# Patient Record
Sex: FEMALE | Race: WHITE | NOT HISPANIC OR LATINO
[De-identification: names, ages, dates, MRNs, and addresses within clinical notes are randomized per-mention and may not be internally consistent; named-entity substitution may affect disease eponyms.]

---

## 2017-08-23 ENCOUNTER — APPOINTMENT (OUTPATIENT)
Dept: DERMATOLOGY | Facility: CLINIC | Age: 62
End: 2017-08-23

## 2017-10-19 ENCOUNTER — TRANSCRIPTION ENCOUNTER (OUTPATIENT)
Age: 62
End: 2017-10-19

## 2017-12-11 ENCOUNTER — APPOINTMENT (OUTPATIENT)
Dept: DERMATOLOGY | Facility: CLINIC | Age: 62
End: 2017-12-11

## 2017-12-19 ENCOUNTER — APPOINTMENT (OUTPATIENT)
Dept: DERMATOLOGY | Facility: CLINIC | Age: 62
End: 2017-12-19
Payer: MEDICARE

## 2017-12-19 VITALS — HEIGHT: 63 IN | BODY MASS INDEX: 30.12 KG/M2 | WEIGHT: 170 LBS

## 2017-12-19 DIAGNOSIS — L25.9 UNSPECIFIED CONTACT DERMATITIS, UNSPECIFIED CAUSE: ICD-10-CM

## 2017-12-19 DIAGNOSIS — H54.7 UNSPECIFIED VISUAL LOSS: ICD-10-CM

## 2017-12-19 DIAGNOSIS — Z87.891 PERSONAL HISTORY OF NICOTINE DEPENDENCE: ICD-10-CM

## 2017-12-19 DIAGNOSIS — Z85.118 PERSONAL HISTORY OF OTHER MALIGNANT NEOPLASM OF BRONCHUS AND LUNG: ICD-10-CM

## 2017-12-19 DIAGNOSIS — Z86.39 PERSONAL HISTORY OF OTHER ENDOCRINE, NUTRITIONAL AND METABOLIC DISEASE: ICD-10-CM

## 2017-12-19 PROCEDURE — 99213 OFFICE O/P EST LOW 20 MIN: CPT

## 2017-12-19 RX ORDER — LEVOFLOXACIN 500 MG/1
500 TABLET, FILM COATED ORAL
Qty: 7 | Refills: 0 | Status: COMPLETED | COMMUNITY
Start: 2017-10-18

## 2017-12-19 RX ORDER — PREDNISONE 20 MG/1
20 TABLET ORAL
Qty: 15 | Refills: 0 | Status: COMPLETED | COMMUNITY
Start: 2017-10-15

## 2017-12-19 RX ORDER — AMOXICILLIN AND CLAVULANATE POTASSIUM 875; 125 MG/1; MG/1
875-125 TABLET, COATED ORAL
Qty: 14 | Refills: 0 | Status: COMPLETED | COMMUNITY
Start: 2017-07-10

## 2018-03-28 ENCOUNTER — APPOINTMENT (OUTPATIENT)
Dept: DERMATOLOGY | Facility: CLINIC | Age: 63
End: 2018-03-28
Payer: MEDICARE

## 2018-03-28 DIAGNOSIS — L64.9 ANDROGENIC ALOPECIA, UNSPECIFIED: ICD-10-CM

## 2018-03-28 DIAGNOSIS — L24.89 IRRITANT CONTACT DERMATITIS DUE TO OTHER AGENTS: ICD-10-CM

## 2018-03-28 PROCEDURE — 99213 OFFICE O/P EST LOW 20 MIN: CPT

## 2018-03-28 RX ORDER — CLARITHROMYCIN 500 MG/1
500 TABLET, FILM COATED ORAL
Qty: 20 | Refills: 0 | Status: COMPLETED | COMMUNITY
Start: 2017-12-30

## 2018-10-18 ENCOUNTER — FORM ENCOUNTER (OUTPATIENT)
Age: 63
End: 2018-10-18

## 2018-10-19 ENCOUNTER — APPOINTMENT (OUTPATIENT)
Dept: RADIOLOGY | Facility: CLINIC | Age: 63
End: 2018-10-19
Payer: MEDICARE

## 2018-10-19 ENCOUNTER — OUTPATIENT (OUTPATIENT)
Dept: OUTPATIENT SERVICES | Facility: HOSPITAL | Age: 63
LOS: 1 days | End: 2018-10-19
Payer: MEDICARE

## 2018-10-19 ENCOUNTER — APPOINTMENT (OUTPATIENT)
Dept: UROLOGY | Facility: CLINIC | Age: 63
End: 2018-10-19
Payer: MEDICARE

## 2018-10-19 VITALS
OXYGEN SATURATION: 96 % | HEART RATE: 81 BPM | HEIGHT: 64 IN | TEMPERATURE: 97.8 F | DIASTOLIC BLOOD PRESSURE: 87 MMHG | SYSTOLIC BLOOD PRESSURE: 137 MMHG | BODY MASS INDEX: 28.17 KG/M2 | WEIGHT: 165 LBS

## 2018-10-19 DIAGNOSIS — Z00.8 ENCOUNTER FOR OTHER GENERAL EXAMINATION: ICD-10-CM

## 2018-10-19 PROCEDURE — 52310 CYSTOSCOPY AND TREATMENT: CPT

## 2018-10-19 PROCEDURE — 74018 RADEX ABDOMEN 1 VIEW: CPT

## 2018-10-19 PROCEDURE — 74018 RADEX ABDOMEN 1 VIEW: CPT | Mod: 26

## 2018-10-19 PROCEDURE — 99204 OFFICE O/P NEW MOD 45 MIN: CPT | Mod: 25

## 2018-10-21 LAB
APPEARANCE: CLEAR
BACTERIA UR CULT: NORMAL
BACTERIA: NEGATIVE
BILIRUBIN URINE: NEGATIVE
BLOOD URINE: ABNORMAL
COLOR: YELLOW
GLUCOSE QUALITATIVE U: NEGATIVE MG/DL
HYALINE CASTS: 2 /LPF
KETONES URINE: NEGATIVE
LEUKOCYTE ESTERASE URINE: ABNORMAL
MICROSCOPIC-UA: NORMAL
NITRITE URINE: NEGATIVE
PH URINE: 6.5
PROTEIN URINE: 30 MG/DL
RED BLOOD CELLS URINE: 6 /HPF
SPECIFIC GRAVITY URINE: 1.01
SQUAMOUS EPITHELIAL CELLS: 2 /HPF
UROBILINOGEN URINE: NEGATIVE MG/DL
WHITE BLOOD CELLS URINE: 6 /HPF

## 2018-10-22 LAB — CORE LAB FLUID CYTOLOGY: NORMAL

## 2019-04-15 ENCOUNTER — FORM ENCOUNTER (OUTPATIENT)
Age: 64
End: 2019-04-15

## 2019-04-16 ENCOUNTER — APPOINTMENT (OUTPATIENT)
Dept: ULTRASOUND IMAGING | Facility: CLINIC | Age: 64
End: 2019-04-16
Payer: MEDICARE

## 2019-04-16 ENCOUNTER — OUTPATIENT (OUTPATIENT)
Dept: OUTPATIENT SERVICES | Facility: HOSPITAL | Age: 64
LOS: 1 days | End: 2019-04-16
Payer: MEDICARE

## 2019-04-16 DIAGNOSIS — Z00.8 ENCOUNTER FOR OTHER GENERAL EXAMINATION: ICD-10-CM

## 2019-04-16 PROCEDURE — 76770 US EXAM ABDO BACK WALL COMP: CPT | Mod: 26

## 2019-04-16 PROCEDURE — 76770 US EXAM ABDO BACK WALL COMP: CPT

## 2019-04-18 ENCOUNTER — TRANSCRIPTION ENCOUNTER (OUTPATIENT)
Age: 64
End: 2019-04-18

## 2019-04-19 ENCOUNTER — APPOINTMENT (OUTPATIENT)
Dept: UROLOGY | Facility: CLINIC | Age: 64
End: 2019-04-19
Payer: MEDICARE

## 2019-04-19 VITALS
HEART RATE: 82 BPM | DIASTOLIC BLOOD PRESSURE: 84 MMHG | WEIGHT: 165 LBS | HEIGHT: 63 IN | SYSTOLIC BLOOD PRESSURE: 123 MMHG | TEMPERATURE: 98.9 F | BODY MASS INDEX: 29.23 KG/M2 | OXYGEN SATURATION: 98 %

## 2019-04-19 DIAGNOSIS — N20.1 CALCULUS OF URETER: ICD-10-CM

## 2019-04-19 DIAGNOSIS — Z87.440 PERSONAL HISTORY OF URINARY (TRACT) INFECTIONS: ICD-10-CM

## 2019-04-19 DIAGNOSIS — R35.0 FREQUENCY OF MICTURITION: ICD-10-CM

## 2019-04-19 LAB
BILIRUB UR QL STRIP: NORMAL
GLUCOSE UR-MCNC: NORMAL
HCG UR QL: 0.2 EU/DL
HGB UR QL STRIP.AUTO: NORMAL
KETONES UR-MCNC: NORMAL
LEUKOCYTE ESTERASE UR QL STRIP: NORMAL
NITRITE UR QL STRIP: NORMAL
PH UR STRIP: 5.5
PROT UR STRIP-MCNC: NORMAL
SP GR UR STRIP: 1

## 2019-04-19 PROCEDURE — 81003 URINALYSIS AUTO W/O SCOPE: CPT | Mod: QW

## 2019-04-19 PROCEDURE — 99213 OFFICE O/P EST LOW 20 MIN: CPT | Mod: 25

## 2019-04-19 NOTE — HISTORY OF PRESENT ILLNESS
[FreeTextEntry1] : This patient presents for a follow up on urinary stone formation. Her recent sonogram is completely normal. She is having some irritative symptoms with increasing frequency. Her urine dip negative today.

## 2019-04-19 NOTE — PHYSICAL EXAM
[General Appearance - Well Developed] : well developed [General Appearance - Well Nourished] : well nourished [General Appearance - In No Acute Distress] : no acute distress [Well Groomed] : well groomed [Normal Appearance] : normal appearance [Abdomen Soft] : soft [Abdomen Tenderness] : non-tender [Costovertebral Angle Tenderness] : no ~M costovertebral angle tenderness [Urinary Bladder Findings] : the bladder was normal on palpation [FreeTextEntry1] : Atrophic changes consistent with age [] : no respiratory distress [Respiration, Rhythm And Depth] : normal respiratory rhythm and effort [Edema] : no peripheral edema [Oriented To Time, Place, And Person] : oriented to person, place, and time [Exaggerated Use Of Accessory Muscles For Inspiration] : no accessory muscle use [Affect] : the affect was normal [Not Anxious] : not anxious [Mood] : the mood was normal [No Focal Deficits] : no focal deficits [Normal Station and Gait] : the gait and station were normal for the patient's age [No Palpable Adenopathy] : no palpable adenopathy

## 2019-04-19 NOTE — ASSESSMENT
[FreeTextEntry1] : Lower tract symptoms may be secondary to overactive bladder or an infection even with the negative urine analysis

## 2019-04-20 LAB
APPEARANCE: CLEAR
BACTERIA: NEGATIVE
BILIRUBIN URINE: NEGATIVE
BLOOD URINE: NEGATIVE
COLOR: COLORLESS
GLUCOSE QUALITATIVE U: NEGATIVE
HYALINE CASTS: 0 /LPF
KETONES URINE: NEGATIVE
LEUKOCYTE ESTERASE URINE: NEGATIVE
MICROSCOPIC-UA: NORMAL
NITRITE URINE: NEGATIVE
PH URINE: 6
PROTEIN URINE: NEGATIVE
RED BLOOD CELLS URINE: 0 /HPF
SPECIFIC GRAVITY URINE: 1
SQUAMOUS EPITHELIAL CELLS: 0 /HPF
UROBILINOGEN URINE: NORMAL
WHITE BLOOD CELLS URINE: 0 /HPF

## 2019-04-24 ENCOUNTER — RECORD ABSTRACTING (OUTPATIENT)
Age: 64
End: 2019-04-24

## 2019-04-24 DIAGNOSIS — Z86.010 PERSONAL HISTORY OF COLONIC POLYPS: ICD-10-CM

## 2019-04-24 DIAGNOSIS — M54.9 DORSALGIA, UNSPECIFIED: ICD-10-CM

## 2019-04-24 DIAGNOSIS — K80.20 CALCULUS OF GALLBLADDER W/OUT CHOLECYSTITIS W/OUT OBSTRUCTION: ICD-10-CM

## 2019-04-24 DIAGNOSIS — Z83.79 FAMILY HISTORY OF OTHER DISEASES OF THE DIGESTIVE SYSTEM: ICD-10-CM

## 2019-04-24 LAB — URINE CYTOLOGY: NORMAL

## 2019-05-01 ENCOUNTER — APPOINTMENT (OUTPATIENT)
Age: 64
End: 2019-05-01
Payer: MEDICARE

## 2019-05-01 VITALS
HEART RATE: 77 BPM | BODY MASS INDEX: 29.23 KG/M2 | HEIGHT: 63 IN | SYSTOLIC BLOOD PRESSURE: 123 MMHG | WEIGHT: 165 LBS | DIASTOLIC BLOOD PRESSURE: 77 MMHG

## 2019-05-01 DIAGNOSIS — Z12.10 ENCOUNTER FOR SCREENING FOR MALIGNANT NEOPLASM OF INTESTINAL TRACT, UNSPECIFIED: ICD-10-CM

## 2019-05-01 PROCEDURE — 99202 OFFICE O/P NEW SF 15 MIN: CPT

## 2019-05-01 RX ORDER — LEVOTHYROXINE SODIUM 125 UG/1
125 TABLET ORAL
Refills: 0 | Status: DISCONTINUED | COMMUNITY
End: 2019-05-01

## 2019-05-01 RX ORDER — VALACYCLOVIR 1 G/1
1 TABLET, FILM COATED ORAL
Qty: 21 | Refills: 0 | Status: DISCONTINUED | COMMUNITY
Start: 2017-07-13 | End: 2019-05-01

## 2019-05-01 RX ORDER — FLUTICASONE PROPIONATE 50 UG/1
50 SPRAY, METERED NASAL
Qty: 16 | Refills: 0 | Status: DISCONTINUED | COMMUNITY
Start: 2017-06-19 | End: 2019-05-01

## 2019-05-01 RX ORDER — ALBUTEROL SULFATE 90 UG/1
108 (90 BASE) AEROSOL, METERED RESPIRATORY (INHALATION)
Qty: 8 | Refills: 0 | Status: DISCONTINUED | COMMUNITY
Start: 2017-10-11 | End: 2019-05-01

## 2019-05-01 RX ORDER — NITROFURANTOIN MACROCRYSTALS 100 MG/1
100 CAPSULE ORAL 3 TIMES DAILY
Qty: 21 | Refills: 0 | Status: DISCONTINUED | COMMUNITY
Start: 2019-04-19 | End: 2019-05-01

## 2019-05-01 RX ORDER — ALBUTEROL SULFATE 2.5 MG/3ML
(2.5 MG/3ML) SOLUTION RESPIRATORY (INHALATION)
Qty: 180 | Refills: 0 | Status: DISCONTINUED | COMMUNITY
Start: 2017-10-15 | End: 2019-05-01

## 2019-05-01 RX ORDER — AZELASTINE HYDROCHLORIDE 0.5 MG/ML
0.05 SOLUTION/ DROPS OPHTHALMIC
Qty: 6 | Refills: 0 | Status: DISCONTINUED | COMMUNITY
Start: 2017-06-19 | End: 2019-05-01

## 2019-05-01 RX ORDER — PREDNISONE 10 MG/1
10 TABLET ORAL
Qty: 39 | Refills: 0 | Status: DISCONTINUED | COMMUNITY
Start: 2017-10-24 | End: 2019-05-01

## 2019-05-01 RX ORDER — ALPRAZOLAM 0.25 MG/1
0.25 TABLET ORAL
Qty: 30 | Refills: 0 | Status: DISCONTINUED | COMMUNITY
Start: 2017-07-10 | End: 2019-05-01

## 2019-05-01 RX ORDER — SULFAMETHOXAZOLE AND TRIMETHOPRIM 800; 160 MG/1; MG/1
800-160 TABLET ORAL
Qty: 10 | Refills: 0 | Status: DISCONTINUED | COMMUNITY
Start: 2018-12-01

## 2019-05-01 RX ORDER — MONTELUKAST 10 MG/1
10 TABLET, FILM COATED ORAL
Qty: 90 | Refills: 0 | Status: DISCONTINUED | COMMUNITY
Start: 2017-10-11 | End: 2019-05-01

## 2019-05-01 RX ORDER — FLUTICASONE FUROATE AND VILANTEROL TRIFENATATE 200; 25 UG/1; UG/1
200-25 POWDER RESPIRATORY (INHALATION)
Qty: 60 | Refills: 0 | Status: DISCONTINUED | COMMUNITY
Start: 2017-10-18 | End: 2019-05-01

## 2019-05-01 RX ORDER — FLUTICASONE PROPIONATE 0.5 MG/G
0.05 CREAM TOPICAL TWICE DAILY
Qty: 1 | Refills: 2 | Status: DISCONTINUED | COMMUNITY
Start: 2017-12-19 | End: 2019-05-01

## 2019-05-01 RX ORDER — FLUTICASONE PROPIONATE 110 UG/1
110 AEROSOL, METERED RESPIRATORY (INHALATION)
Qty: 12 | Refills: 0 | Status: DISCONTINUED | COMMUNITY
Start: 2017-11-10 | End: 2019-05-01

## 2019-05-01 NOTE — PHYSICAL EXAM
[General Appearance - Alert] : alert [General Appearance - In No Acute Distress] : in no acute distress [Auscultation Breath Sounds / Voice Sounds] : lungs were clear to auscultation bilaterally [Heart Rate And Rhythm] : heart rate was normal and rhythm regular [Heart Sounds] : normal S1 and S2 [Murmurs] : no murmurs [Heart Sounds Gallop] : no gallops [Bowel Sounds] : normal bowel sounds [Heart Sounds Pericardial Friction Rub] : no pericardial rub [Abdomen Soft] : soft [] : no hepato-splenomegaly [Abdomen Tenderness] : non-tender [Abdomen Mass (___ Cm)] : no abdominal mass palpated

## 2019-05-01 NOTE — HISTORY OF PRESENT ILLNESS
[de-identified] : 64 yo female for colonoscopy. Last done in 2014. No complaints of bowel issues.No complaints of GERD. Patient recently had resection for lung cancer.

## 2019-05-09 ENCOUNTER — RX CHANGE (OUTPATIENT)
Age: 64
End: 2019-05-09

## 2019-05-10 ENCOUNTER — APPOINTMENT (OUTPATIENT)
Age: 64
End: 2019-05-10

## 2019-05-10 ENCOUNTER — APPOINTMENT (OUTPATIENT)
Dept: UROLOGY | Facility: CLINIC | Age: 64
End: 2019-05-10

## 2019-06-13 ENCOUNTER — APPOINTMENT (OUTPATIENT)
Dept: GASTROENTEROLOGY | Facility: AMBULATORY MEDICAL SERVICES | Age: 64
End: 2019-06-13
Payer: MEDICARE

## 2019-06-13 PROCEDURE — 45378 DIAGNOSTIC COLONOSCOPY: CPT

## 2019-10-25 ENCOUNTER — APPOINTMENT (OUTPATIENT)
Dept: DERMATOLOGY | Facility: CLINIC | Age: 64
End: 2019-10-25

## 2019-11-26 ENCOUNTER — APPOINTMENT (OUTPATIENT)
Dept: DERMATOLOGY | Facility: CLINIC | Age: 64
End: 2019-11-26
Payer: MEDICARE

## 2019-11-26 VITALS — HEIGHT: 63 IN | BODY MASS INDEX: 29.23 KG/M2 | WEIGHT: 165 LBS

## 2019-11-26 PROCEDURE — 99213 OFFICE O/P EST LOW 20 MIN: CPT

## 2019-11-26 NOTE — PHYSICAL EXAM
[FreeTextEntry3] : Skin examination performed of the face, neck, trunk, arms, legs; \par The patient is well, alert and oriented, pleasant and cooperative.\par Eyelids, conjunctivae, oral mucosa, digits and nails all normal.  \par No cervical adenopathy.\par \par Normal findings include:\par \par Seborrheic keratoses- few on back\par Multiple benign nevi were noted. arms\par Angiomas\par Lentigines\par \par No lesions were suspicious for malignancy. \par \par

## 2019-11-26 NOTE — HISTORY OF PRESENT ILLNESS
[de-identified] : Pt. presents for skin check;\par No itching, bleeding, growing, changing lesions noted;\par Severity:  mild  \par Modifying factors:  none\par Associated symptoms:  none\par Context:  no association with activity

## 2019-11-26 NOTE — ASSESSMENT
[FreeTextEntry1] : Complete skin examination is negative for malignancy;\par Continue regular exams; \par

## 2020-03-28 ENCOUNTER — LABORATORY RESULT (OUTPATIENT)
Age: 65
End: 2020-03-28

## 2020-03-28 ENCOUNTER — APPOINTMENT (OUTPATIENT)
Dept: DISASTER EMERGENCY | Facility: CLINIC | Age: 65
End: 2020-03-28
Payer: MEDICARE

## 2020-03-28 VITALS
RESPIRATION RATE: 16 BRPM | HEART RATE: 79 BPM | OXYGEN SATURATION: 98 % | TEMPERATURE: 98 F | DIASTOLIC BLOOD PRESSURE: 70 MMHG | SYSTOLIC BLOOD PRESSURE: 120 MMHG

## 2020-03-28 DIAGNOSIS — Z20.828 CONTACT WITH AND (SUSPECTED) EXPOSURE TO OTHER VIRAL COMMUNICABLE DISEASES: ICD-10-CM

## 2020-03-28 DIAGNOSIS — J98.4 OTHER DISORDERS OF LUNG: ICD-10-CM

## 2020-03-28 DIAGNOSIS — R68.89 OTHER GENERAL SYMPTOMS AND SIGNS: ICD-10-CM

## 2020-03-28 PROCEDURE — 99203 OFFICE O/P NEW LOW 30 MIN: CPT

## 2020-03-28 NOTE — PHYSICAL EXAM
[Normal Rate] : the respiratory rate was normal [Dry Cough] : a dry cough was heard [Clear Bilaterally] : the lungs were clear to auscultation bilaterally [Normal] : normal rate, regular rhythm, normal S1 and S2 and no murmur heard [de-identified] : dry congeste cough

## 2020-03-28 NOTE — PLAN
[FreeTextEntry1] : COVID testing and assessment. Patient information provided on self quarantine and social isolation. To follow up to urgent care or  ER or call 911 for increased symptoms\par FU PRN with pyulmonary\par

## 2020-03-28 NOTE — HISTORY OF PRESENT ILLNESS
[___ Days ago] : [unfilled] days ago [Sudden] : suddenly [Constant] : constant [Sore Throat] : sore throat [Shortness Of Breath] : shortness of breath [Fatigue] : fatigue [Congestion] : no congestion [Cough] : no cough [Wheezing] : no wheezing [Chills] : no chills [Fever] : no fever [FreeTextEntry8] : Always SOB lung condition. Users Ventolin PRN. Hx of lung cancer . Lung resection RT 12 Years ago. Wedge resection left 2019\par Lung nodule stable\par  in hospital, positive for COVID , not ventilated. Has PN, will be coming home in a week,. \par Needs testing to determine if cough is usual or due to COVID

## 2020-04-02 PROBLEM — R68.89 SUSPECTED 2019 NOVEL CORONAVIRUS INFECTION: Status: ACTIVE | Noted: 2020-03-28

## 2020-04-02 PROBLEM — Z20.828 EXPOSURE TO 2019 NOVEL CORONAVIRUS: Status: ACTIVE | Noted: 2020-03-28

## 2020-07-29 ENCOUNTER — OUTPATIENT (OUTPATIENT)
Dept: OUTPATIENT SERVICES | Facility: HOSPITAL | Age: 65
LOS: 1 days | Discharge: HOME | End: 2020-07-29

## 2020-07-29 DIAGNOSIS — C34.90 MALIGNANT NEOPLASM OF UNSPECIFIED PART OF UNSPECIFIED BRONCHUS OR LUNG: ICD-10-CM

## 2020-08-04 ENCOUNTER — APPOINTMENT (OUTPATIENT)
Dept: HEMATOLOGY ONCOLOGY | Facility: CLINIC | Age: 65
End: 2020-08-04

## 2020-11-19 ENCOUNTER — APPOINTMENT (OUTPATIENT)
Dept: DERMATOLOGY | Facility: CLINIC | Age: 65
End: 2020-11-19
Payer: MEDICARE

## 2020-11-19 DIAGNOSIS — Z12.83 ENCOUNTER FOR SCREENING FOR MALIGNANT NEOPLASM OF SKIN: ICD-10-CM

## 2020-11-19 PROCEDURE — 99213 OFFICE O/P EST LOW 20 MIN: CPT

## 2020-12-21 PROBLEM — Z87.440 HISTORY OF URINARY TRACT INFECTION: Status: RESOLVED | Noted: 2019-04-19 | Resolved: 2020-12-21

## 2021-05-21 ENCOUNTER — APPOINTMENT (OUTPATIENT)
Dept: GASTROENTEROLOGY | Facility: CLINIC | Age: 66
End: 2021-05-21
Payer: MEDICARE

## 2021-05-21 PROCEDURE — 99442: CPT | Mod: 95

## 2021-05-21 NOTE — REVIEW OF SYSTEMS
[As Noted in HPI] : as noted in HPI [Abdominal Pain] : no abdominal pain [Heartburn] : heartburn [Negative] : Heme/Lymph

## 2021-05-21 NOTE — ASSESSMENT
[FreeTextEntry1] : 65yo female on phone today with c/o gerd like symptoms and some mild dysphagia.  She was taken Tums and pepcid for several days with no relief.  She just started PPI yesterday and feels improved.  \par \par ?GERD/gastritis/esophagitis/PUD.\par \par Plan:\par GERD diet, continue PPI. \par If does not continue to improve, will arrange egd. \par \par Likely all 2/2 acid. \par \par Discussed w Dr. Javed.

## 2021-05-21 NOTE — HISTORY OF PRESENT ILLNESS
[de-identified] : 65yo female on phone today with c/o gerd like symptoms and some mild dysphagia.  She was taken Tums and pepcid for several days with no relief.  She just started PPI yesterday and feels improved.  No abdominal pain, occasional belching.  No abdominal pain, n/v.

## 2021-05-21 NOTE — REASON FOR VISIT
[Home] : at home, [unfilled] , at the time of the visit. [Medical Office: ([place] [facility])___] : at the medical office located in  [Spouse] : spouse [Verbal consent obtained from patient] : the patient, [unfilled] [Follow-Up: _____] : a [unfilled] follow-up visit

## 2021-05-26 RX ORDER — OMEPRAZOLE 20 MG/1
20 CAPSULE, DELAYED RELEASE ORAL DAILY
Qty: 90 | Refills: 2 | Status: ACTIVE | COMMUNITY
Start: 2021-05-26 | End: 1900-01-01

## 2021-06-10 ENCOUNTER — APPOINTMENT (OUTPATIENT)
Dept: FAMILY MEDICINE | Facility: CLINIC | Age: 66
End: 2021-06-10

## 2021-06-10 ENCOUNTER — INPATIENT (INPATIENT)
Facility: HOSPITAL | Age: 66
LOS: 0 days | Discharge: HOME | DRG: 340 | End: 2021-06-11
Attending: SURGERY | Admitting: SURGERY
Payer: MEDICARE

## 2021-06-10 ENCOUNTER — APPOINTMENT (OUTPATIENT)
Dept: GASTROENTEROLOGY | Facility: CLINIC | Age: 66
End: 2021-06-10

## 2021-06-10 ENCOUNTER — RESULT REVIEW (OUTPATIENT)
Age: 66
End: 2021-06-10

## 2021-06-10 VITALS
OXYGEN SATURATION: 99 % | HEIGHT: 64 IN | WEIGHT: 169.98 LBS | SYSTOLIC BLOOD PRESSURE: 143 MMHG | TEMPERATURE: 98 F | DIASTOLIC BLOOD PRESSURE: 80 MMHG | RESPIRATION RATE: 20 BRPM | HEART RATE: 83 BPM

## 2021-06-10 DIAGNOSIS — R10.9 UNSPECIFIED ABDOMINAL PAIN: ICD-10-CM

## 2021-06-10 LAB
ABO RH CONFIRMATION: SIGNIFICANT CHANGE UP
ALBUMIN SERPL ELPH-MCNC: 4.4 G/DL — SIGNIFICANT CHANGE UP (ref 3.3–5)
ALP SERPL-CCNC: 83 U/L — SIGNIFICANT CHANGE UP (ref 40–120)
ALT FLD-CCNC: 37 U/L — SIGNIFICANT CHANGE UP (ref 12–78)
ANION GAP SERPL CALC-SCNC: 8 MMOL/L — SIGNIFICANT CHANGE UP (ref 5–17)
APPEARANCE UR: CLEAR — SIGNIFICANT CHANGE UP
APTT BLD: 28.8 SEC — SIGNIFICANT CHANGE UP (ref 27.5–35.5)
AST SERPL-CCNC: 28 U/L — SIGNIFICANT CHANGE UP (ref 15–37)
BASOPHILS # BLD AUTO: 0.01 K/UL — SIGNIFICANT CHANGE UP (ref 0–0.2)
BASOPHILS NFR BLD AUTO: 0.1 % — SIGNIFICANT CHANGE UP (ref 0–2)
BILIRUB SERPL-MCNC: 0.6 MG/DL — SIGNIFICANT CHANGE UP (ref 0.2–1.2)
BILIRUB UR-MCNC: NEGATIVE — SIGNIFICANT CHANGE UP
BUN SERPL-MCNC: 15 MG/DL — SIGNIFICANT CHANGE UP (ref 7–23)
CALCIUM SERPL-MCNC: 10.1 MG/DL — SIGNIFICANT CHANGE UP (ref 8.5–10.1)
CHLORIDE SERPL-SCNC: 106 MMOL/L — SIGNIFICANT CHANGE UP (ref 96–108)
CO2 SERPL-SCNC: 25 MMOL/L — SIGNIFICANT CHANGE UP (ref 22–31)
COLOR SPEC: YELLOW — SIGNIFICANT CHANGE UP
CREAT SERPL-MCNC: 0.77 MG/DL — SIGNIFICANT CHANGE UP (ref 0.5–1.3)
DIFF PNL FLD: NEGATIVE — SIGNIFICANT CHANGE UP
EOSINOPHIL # BLD AUTO: 0 K/UL — SIGNIFICANT CHANGE UP (ref 0–0.5)
EOSINOPHIL NFR BLD AUTO: 0 % — SIGNIFICANT CHANGE UP (ref 0–6)
GLUCOSE SERPL-MCNC: 106 MG/DL — HIGH (ref 70–99)
GLUCOSE UR QL: NEGATIVE MG/DL — SIGNIFICANT CHANGE UP
HCT VFR BLD CALC: 40.1 % — SIGNIFICANT CHANGE UP (ref 34.5–45)
HGB BLD-MCNC: 14.2 G/DL — SIGNIFICANT CHANGE UP (ref 11.5–15.5)
IMM GRANULOCYTES NFR BLD AUTO: 0.4 % — SIGNIFICANT CHANGE UP (ref 0–1.5)
INR BLD: 1.05 RATIO — SIGNIFICANT CHANGE UP (ref 0.88–1.16)
KETONES UR-MCNC: NEGATIVE — SIGNIFICANT CHANGE UP
LACTATE SERPL-SCNC: 3.4 MMOL/L — HIGH (ref 0.7–2)
LEUKOCYTE ESTERASE UR-ACNC: NEGATIVE — SIGNIFICANT CHANGE UP
LIDOCAIN IGE QN: 81 U/L — SIGNIFICANT CHANGE UP (ref 73–393)
LYMPHOCYTES # BLD AUTO: 0.56 K/UL — LOW (ref 1–3.3)
LYMPHOCYTES # BLD AUTO: 5.8 % — LOW (ref 13–44)
MCHC RBC-ENTMCNC: 29.9 PG — SIGNIFICANT CHANGE UP (ref 27–34)
MCHC RBC-ENTMCNC: 35.4 GM/DL — SIGNIFICANT CHANGE UP (ref 32–36)
MCV RBC AUTO: 84.4 FL — SIGNIFICANT CHANGE UP (ref 80–100)
MONOCYTES # BLD AUTO: 0.35 K/UL — SIGNIFICANT CHANGE UP (ref 0–0.9)
MONOCYTES NFR BLD AUTO: 3.6 % — SIGNIFICANT CHANGE UP (ref 2–14)
NEUTROPHILS # BLD AUTO: 8.73 K/UL — HIGH (ref 1.8–7.4)
NEUTROPHILS NFR BLD AUTO: 90.1 % — HIGH (ref 43–77)
NITRITE UR-MCNC: NEGATIVE — SIGNIFICANT CHANGE UP
PH UR: 7 — SIGNIFICANT CHANGE UP (ref 5–8)
PLATELET # BLD AUTO: 200 K/UL — SIGNIFICANT CHANGE UP (ref 150–400)
POTASSIUM SERPL-MCNC: 3.7 MMOL/L — SIGNIFICANT CHANGE UP (ref 3.5–5.3)
POTASSIUM SERPL-SCNC: 3.7 MMOL/L — SIGNIFICANT CHANGE UP (ref 3.5–5.3)
PROT SERPL-MCNC: 7.7 GM/DL — SIGNIFICANT CHANGE UP (ref 6–8.3)
PROT UR-MCNC: NEGATIVE MG/DL — SIGNIFICANT CHANGE UP
PROTHROM AB SERPL-ACNC: 12.3 SEC — SIGNIFICANT CHANGE UP (ref 10.6–13.6)
RBC # BLD: 4.75 M/UL — SIGNIFICANT CHANGE UP (ref 3.8–5.2)
RBC # FLD: 12.7 % — SIGNIFICANT CHANGE UP (ref 10.3–14.5)
SARS-COV-2 RNA SPEC QL NAA+PROBE: SIGNIFICANT CHANGE UP
SODIUM SERPL-SCNC: 139 MMOL/L — SIGNIFICANT CHANGE UP (ref 135–145)
SP GR SPEC: 1.01 — SIGNIFICANT CHANGE UP (ref 1.01–1.02)
UROBILINOGEN FLD QL: NEGATIVE MG/DL — SIGNIFICANT CHANGE UP
WBC # BLD: 9.69 K/UL — SIGNIFICANT CHANGE UP (ref 3.8–10.5)
WBC # FLD AUTO: 9.69 K/UL — SIGNIFICANT CHANGE UP (ref 3.8–10.5)

## 2021-06-10 PROCEDURE — 83735 ASSAY OF MAGNESIUM: CPT

## 2021-06-10 PROCEDURE — 83605 ASSAY OF LACTIC ACID: CPT

## 2021-06-10 PROCEDURE — 99231 SBSQ HOSP IP/OBS SF/LOW 25: CPT

## 2021-06-10 PROCEDURE — 88304 TISSUE EXAM BY PATHOLOGIST: CPT | Mod: 26

## 2021-06-10 PROCEDURE — 36415 COLL VENOUS BLD VENIPUNCTURE: CPT

## 2021-06-10 PROCEDURE — 99285 EMERGENCY DEPT VISIT HI MDM: CPT

## 2021-06-10 PROCEDURE — 74177 CT ABD & PELVIS W/CONTRAST: CPT | Mod: 26,MA

## 2021-06-10 PROCEDURE — 85027 COMPLETE CBC AUTOMATED: CPT

## 2021-06-10 PROCEDURE — 80048 BASIC METABOLIC PNL TOTAL CA: CPT

## 2021-06-10 PROCEDURE — 93010 ELECTROCARDIOGRAM REPORT: CPT

## 2021-06-10 PROCEDURE — 84100 ASSAY OF PHOSPHORUS: CPT

## 2021-06-10 PROCEDURE — 88304 TISSUE EXAM BY PATHOLOGIST: CPT

## 2021-06-10 PROCEDURE — 71045 X-RAY EXAM CHEST 1 VIEW: CPT | Mod: 26

## 2021-06-10 PROCEDURE — 86769 SARS-COV-2 COVID-19 ANTIBODY: CPT

## 2021-06-10 PROCEDURE — 86803 HEPATITIS C AB TEST: CPT

## 2021-06-10 RX ORDER — PIPERACILLIN AND TAZOBACTAM 4; .5 G/20ML; G/20ML
3.38 INJECTION, POWDER, LYOPHILIZED, FOR SOLUTION INTRAVENOUS ONCE
Refills: 0 | Status: COMPLETED | OUTPATIENT
Start: 2021-06-10 | End: 2021-06-10

## 2021-06-10 RX ORDER — MORPHINE SULFATE 50 MG/1
2 CAPSULE, EXTENDED RELEASE ORAL EVERY 4 HOURS
Refills: 0 | Status: DISCONTINUED | OUTPATIENT
Start: 2021-06-10 | End: 2021-06-11

## 2021-06-10 RX ORDER — FENTANYL CITRATE 50 UG/ML
50 INJECTION INTRAVENOUS
Refills: 0 | Status: DISCONTINUED | OUTPATIENT
Start: 2021-06-10 | End: 2021-06-11

## 2021-06-10 RX ORDER — HYDROMORPHONE HYDROCHLORIDE 2 MG/ML
0.5 INJECTION INTRAMUSCULAR; INTRAVENOUS; SUBCUTANEOUS
Refills: 0 | Status: DISCONTINUED | OUTPATIENT
Start: 2021-06-10 | End: 2021-06-11

## 2021-06-10 RX ORDER — CHOLECALCIFEROL (VITAMIN D3) 125 MCG
1 CAPSULE ORAL
Qty: 0 | Refills: 0 | DISCHARGE

## 2021-06-10 RX ORDER — PIPERACILLIN AND TAZOBACTAM 4; .5 G/20ML; G/20ML
3.38 INJECTION, POWDER, LYOPHILIZED, FOR SOLUTION INTRAVENOUS EVERY 8 HOURS
Refills: 0 | Status: DISCONTINUED | OUTPATIENT
Start: 2021-06-10 | End: 2021-06-11

## 2021-06-10 RX ORDER — ASCORBIC ACID 60 MG
500 TABLET,CHEWABLE ORAL DAILY
Refills: 0 | Status: DISCONTINUED | OUTPATIENT
Start: 2021-06-10 | End: 2021-06-10

## 2021-06-10 RX ORDER — SODIUM CHLORIDE 9 MG/ML
1000 INJECTION INTRAMUSCULAR; INTRAVENOUS; SUBCUTANEOUS ONCE
Refills: 0 | Status: COMPLETED | OUTPATIENT
Start: 2021-06-10 | End: 2021-06-10

## 2021-06-10 RX ORDER — PANTOPRAZOLE SODIUM 20 MG/1
40 TABLET, DELAYED RELEASE ORAL
Refills: 0 | Status: DISCONTINUED | OUTPATIENT
Start: 2021-06-10 | End: 2021-06-11

## 2021-06-10 RX ORDER — ONDANSETRON 8 MG/1
4 TABLET, FILM COATED ORAL ONCE
Refills: 0 | Status: COMPLETED | OUTPATIENT
Start: 2021-06-10 | End: 2021-06-10

## 2021-06-10 RX ORDER — OXYCODONE AND ACETAMINOPHEN 5; 325 MG/1; MG/1
1 TABLET ORAL EVERY 4 HOURS
Refills: 0 | Status: DISCONTINUED | OUTPATIENT
Start: 2021-06-10 | End: 2021-06-11

## 2021-06-10 RX ORDER — SODIUM CHLORIDE 9 MG/ML
1000 INJECTION INTRAMUSCULAR; INTRAVENOUS; SUBCUTANEOUS
Refills: 0 | Status: DISCONTINUED | OUTPATIENT
Start: 2021-06-10 | End: 2021-06-11

## 2021-06-10 RX ORDER — VITAMIN E 100 UNIT
1 CAPSULE ORAL
Qty: 0 | Refills: 0 | DISCHARGE

## 2021-06-10 RX ORDER — ENOXAPARIN SODIUM 100 MG/ML
40 INJECTION SUBCUTANEOUS DAILY
Refills: 0 | Status: DISCONTINUED | OUTPATIENT
Start: 2021-06-10 | End: 2021-06-11

## 2021-06-10 RX ORDER — MORPHINE SULFATE 50 MG/1
4 CAPSULE, EXTENDED RELEASE ORAL ONCE
Refills: 0 | Status: DISCONTINUED | OUTPATIENT
Start: 2021-06-10 | End: 2021-06-10

## 2021-06-10 RX ORDER — LEVOTHYROXINE SODIUM 125 MCG
1 TABLET ORAL
Qty: 0 | Refills: 0 | DISCHARGE

## 2021-06-10 RX ORDER — SODIUM CHLORIDE 9 MG/ML
1000 INJECTION, SOLUTION INTRAVENOUS
Refills: 0 | Status: DISCONTINUED | OUTPATIENT
Start: 2021-06-10 | End: 2021-06-10

## 2021-06-10 RX ORDER — OMEPRAZOLE 10 MG/1
1 CAPSULE, DELAYED RELEASE ORAL
Qty: 0 | Refills: 0 | DISCHARGE

## 2021-06-10 RX ORDER — LEVOTHYROXINE SODIUM 125 MCG
125 TABLET ORAL DAILY
Refills: 0 | Status: DISCONTINUED | OUTPATIENT
Start: 2021-06-10 | End: 2021-06-11

## 2021-06-10 RX ORDER — ACETAMINOPHEN 500 MG
650 TABLET ORAL EVERY 6 HOURS
Refills: 0 | Status: DISCONTINUED | OUTPATIENT
Start: 2021-06-10 | End: 2021-06-10

## 2021-06-10 RX ADMIN — SODIUM CHLORIDE 1000 MILLILITER(S): 9 INJECTION INTRAMUSCULAR; INTRAVENOUS; SUBCUTANEOUS at 16:50

## 2021-06-10 RX ADMIN — SODIUM CHLORIDE 1000 MILLILITER(S): 9 INJECTION INTRAMUSCULAR; INTRAVENOUS; SUBCUTANEOUS at 15:50

## 2021-06-10 RX ADMIN — ONDANSETRON 4 MILLIGRAM(S): 8 TABLET, FILM COATED ORAL at 15:54

## 2021-06-10 RX ADMIN — FENTANYL CITRATE 50 MICROGRAM(S): 50 INJECTION INTRAVENOUS at 21:20

## 2021-06-10 RX ADMIN — FENTANYL CITRATE 50 MICROGRAM(S): 50 INJECTION INTRAVENOUS at 21:30

## 2021-06-10 RX ADMIN — FENTANYL CITRATE 50 MICROGRAM(S): 50 INJECTION INTRAVENOUS at 21:15

## 2021-06-10 RX ADMIN — ONDANSETRON 4 MILLIGRAM(S): 8 TABLET, FILM COATED ORAL at 22:28

## 2021-06-10 RX ADMIN — HYDROMORPHONE HYDROCHLORIDE 0.5 MILLIGRAM(S): 2 INJECTION INTRAMUSCULAR; INTRAVENOUS; SUBCUTANEOUS at 21:35

## 2021-06-10 RX ADMIN — PIPERACILLIN AND TAZOBACTAM 200 GRAM(S): 4; .5 INJECTION, POWDER, LYOPHILIZED, FOR SOLUTION INTRAVENOUS at 17:28

## 2021-06-10 RX ADMIN — PIPERACILLIN AND TAZOBACTAM 3.38 GRAM(S): 4; .5 INJECTION, POWDER, LYOPHILIZED, FOR SOLUTION INTRAVENOUS at 17:58

## 2021-06-10 RX ADMIN — MORPHINE SULFATE 4 MILLIGRAM(S): 50 CAPSULE, EXTENDED RELEASE ORAL at 15:54

## 2021-06-10 RX ADMIN — MORPHINE SULFATE 4 MILLIGRAM(S): 50 CAPSULE, EXTENDED RELEASE ORAL at 16:24

## 2021-06-10 RX ADMIN — HYDROMORPHONE HYDROCHLORIDE 0.5 MILLIGRAM(S): 2 INJECTION INTRAMUSCULAR; INTRAVENOUS; SUBCUTANEOUS at 21:40

## 2021-06-10 RX ADMIN — HYDROMORPHONE HYDROCHLORIDE 0.5 MILLIGRAM(S): 2 INJECTION INTRAMUSCULAR; INTRAVENOUS; SUBCUTANEOUS at 22:00

## 2021-06-10 RX ADMIN — SODIUM CHLORIDE 1000 MILLILITER(S): 9 INJECTION INTRAMUSCULAR; INTRAVENOUS; SUBCUTANEOUS at 19:18

## 2021-06-10 RX ADMIN — HYDROMORPHONE HYDROCHLORIDE 0.5 MILLIGRAM(S): 2 INJECTION INTRAMUSCULAR; INTRAVENOUS; SUBCUTANEOUS at 21:30

## 2021-06-10 RX ADMIN — SODIUM CHLORIDE 75 MILLILITER(S): 9 INJECTION, SOLUTION INTRAVENOUS at 22:03

## 2021-06-10 NOTE — ED PROVIDER NOTE - CONSTITUTIONAL, MLM
Ill appearing, awake, alert, oriented to person, place, time/situation and in moderate apparent distress. normal...

## 2021-06-10 NOTE — H&P ADULT - HISTORY OF PRESENT ILLNESS
A 66 year old female who was seen in the ED for abdominal pain that started last night. Pain was felt in the center of the lower abdomen, sharp, severe, not radiating or shifting, associated with nausea and one episode of vomiting. Denies fever but admits to chills, Had one loose bowel movement this morning and was having loose bowel movements last weeks. No urinary symptoms and no blood per rectum.

## 2021-06-10 NOTE — PROGRESS NOTE ADULT - SUBJECTIVE AND OBJECTIVE BOX
Asked by nurse to evaluate patient's umbilical incision.  Patient resting comfortably with no complaints.    Vital Signs Last 24 Hrs  T(C): 37 (10 Khang 2021 21:03), Max: 37 (10 Khang 2021 21:03)  T(F): 98.6 (10 Khang 2021 21:03), Max: 98.6 (10 Khang 2021 21:03)  HR: 80 (10 Khang 2021 22:00) (75 - 88)  BP: 115/62 (10 Khang 2021 22:00) (103/62 - 143/80)  BP(mean): --  RR: 14 (10 Khang 2021 22:00) (14 - 20)  SpO2: 99% (10 Khang 2021 22:00) (99% - 100%)    Abdomen:  nondistended, periumbilical steri-strips with blood noted, suprapubic midline steri-strips with a trace of blood, left lower abdomen steri-strips C/D/I.  No active bleeding noted.  soft, right lower quadrant tenderness, no guarding, no rebound.  4x4 dressing with a piece of tape loosely placed over periumbilical steri-strips.      A/P: POD # 0 s/p lap appy for acute appendicitis    -- wound sites with no active bleeding  -- continue present plan of care  -- recall surgery if any further changes noted

## 2021-06-10 NOTE — ED PROVIDER NOTE - OBJECTIVE STATEMENT
65 yo F from home with  with PMHx, lung ca s/p resection, kidney stones, GERD with c/o abd pain since 2am. +vomit yellow/greenish. +BM today, dark in color. No fever. Tried TUMS , no help. Denies OAC, denies frequent EtOH, did not take Pepto, is not on iron supplements. no hx of GI bleeding. Went out to eat last PM and thought maybe she got food poisoning. PMD Elliott Nagel 67 yo F from home with  with PMHx, COPD, lung ca s/p resection (right side 12 years ago, left side 2019), kidney stones, choleycystectomy, GERD on Protonix with c/o abd pain since 2am. +vomit yellow/greenish. Bad diarrhea 2 days ago. + normal BM today, dark in color. No fever. Tried TUMS , no help. Denies OAC, denies frequent EtOH, did not take Pepto, is not on iron supplements. no hx of GI bleeding. Went out to eat last PM and thought maybe she got food poisoning. Colonoscopy last year, normal. PMD Elliott Ngael

## 2021-06-10 NOTE — H&P ADULT - ATTENDING COMMENTS
Patient was seen and examined, modifications and corrections made  I agree with findings and plan    Plan  Laparoscopic possible open appendectomy  All material risks and benefits to surgery and no surgery were discussed with patient.  These include, but not exclusive to,  infection, hematoma, bleeding, abscess; organ/nerve/vascular injury; scarring, deformity and/or death.  All questions were answered.

## 2021-06-10 NOTE — ED PROVIDER NOTE - CARE PLAN
Principal Discharge DX:	Abdominal pain   Principal Discharge DX:	Abdominal pain  Secondary Diagnosis:	Appendicitis, acute

## 2021-06-10 NOTE — H&P ADULT - ASSESSMENT
66 year old female with acute appendicitis    PLan:  Admit under Dr Higginbotham  Keep NPO  IV fluids  IV antibiotics  Analgesia  Prepare for surgery tonight    Plan discussed with Dr Higginbotham

## 2021-06-10 NOTE — H&P ADULT - NSHPPHYSICALEXAM_GEN_ALL_CORE
Alert, conscious, oriented  In mild pain, no distress  No pallor, jaundice or cyanosis  Afebrile, stable VS  Dry mucous membranes  Chest clear, GAEB, scars of previous surgeries  Abdomen: tenderness in the lower abdomen\, more on te right side and suprapubic areas, rebound tenderness Alert, conscious, oriented  In mild pain, no distress  No pallor, jaundice or cyanosis  Afebrile, stable VS  Dry mucous membranes  Chest clear, scars of previous surgeries  Abdomen: tenderness in the lower abdomen\, more on te right side and suprapubic areas, rebound tenderness

## 2021-06-10 NOTE — ED ADULT NURSE NOTE - OBJECTIVE STATEMENT
patient axox3, c/o sharp, left-lower quadrant abdominal pain since 2 AM this morning. +vomiting and diarrhea 2 days ago. + normal BM today, dark in color. patient denies headache, nausea, fever, chills, cough, chest pain, SOB. patient took Tums PTA with no relief. patient states she ate seafood last night for dinner and thought maybe she got food poisoning, does not know if the people she ate with are sick too. color good, skin warm and dry, respirations even and unlabored.

## 2021-06-10 NOTE — ED ADULT NURSE REASSESSMENT NOTE - NS ED NURSE REASSESS COMMENT FT1
patient's  took patient's belongings (clothes and cell phone) home with him. patient transported upstairs by OR staff.

## 2021-06-11 ENCOUNTER — TRANSCRIPTION ENCOUNTER (OUTPATIENT)
Age: 66
End: 2021-06-11

## 2021-06-11 VITALS
OXYGEN SATURATION: 100 % | TEMPERATURE: 98 F | SYSTOLIC BLOOD PRESSURE: 109 MMHG | DIASTOLIC BLOOD PRESSURE: 69 MMHG | HEART RATE: 74 BPM | RESPIRATION RATE: 18 BRPM

## 2021-06-11 LAB
ANION GAP SERPL CALC-SCNC: 6 MMOL/L — SIGNIFICANT CHANGE UP (ref 5–17)
BUN SERPL-MCNC: 8 MG/DL — SIGNIFICANT CHANGE UP (ref 7–23)
CALCIUM SERPL-MCNC: 8.6 MG/DL — SIGNIFICANT CHANGE UP (ref 8.5–10.1)
CHLORIDE SERPL-SCNC: 110 MMOL/L — HIGH (ref 96–108)
CO2 SERPL-SCNC: 25 MMOL/L — SIGNIFICANT CHANGE UP (ref 22–31)
COVID-19 SPIKE DOMAIN AB INTERP: POSITIVE
COVID-19 SPIKE DOMAIN ANTIBODY RESULT: >250 U/ML — HIGH
CREAT SERPL-MCNC: 0.7 MG/DL — SIGNIFICANT CHANGE UP (ref 0.5–1.3)
GLUCOSE SERPL-MCNC: 89 MG/DL — SIGNIFICANT CHANGE UP (ref 70–99)
HCT VFR BLD CALC: 34.5 % — SIGNIFICANT CHANGE UP (ref 34.5–45)
HCV AB S/CO SERPL IA: 0.07 S/CO — SIGNIFICANT CHANGE UP (ref 0–0.99)
HCV AB SERPL-IMP: SIGNIFICANT CHANGE UP
HGB BLD-MCNC: 11.5 G/DL — SIGNIFICANT CHANGE UP (ref 11.5–15.5)
MAGNESIUM SERPL-MCNC: 2.1 MG/DL — SIGNIFICANT CHANGE UP (ref 1.6–2.6)
MCHC RBC-ENTMCNC: 29.7 PG — SIGNIFICANT CHANGE UP (ref 27–34)
MCHC RBC-ENTMCNC: 33.3 GM/DL — SIGNIFICANT CHANGE UP (ref 32–36)
MCV RBC AUTO: 89.1 FL — SIGNIFICANT CHANGE UP (ref 80–100)
PHOSPHATE SERPL-MCNC: 2.5 MG/DL — SIGNIFICANT CHANGE UP (ref 2.5–4.5)
PLATELET # BLD AUTO: 163 K/UL — SIGNIFICANT CHANGE UP (ref 150–400)
POTASSIUM SERPL-MCNC: 3.3 MMOL/L — LOW (ref 3.5–5.3)
POTASSIUM SERPL-SCNC: 3.3 MMOL/L — LOW (ref 3.5–5.3)
RBC # BLD: 3.87 M/UL — SIGNIFICANT CHANGE UP (ref 3.8–5.2)
RBC # FLD: 13.3 % — SIGNIFICANT CHANGE UP (ref 10.3–14.5)
SARS-COV-2 IGG+IGM SERPL QL IA: >250 U/ML — HIGH
SARS-COV-2 IGG+IGM SERPL QL IA: POSITIVE
SODIUM SERPL-SCNC: 141 MMOL/L — SIGNIFICANT CHANGE UP (ref 135–145)
WBC # BLD: 6.22 K/UL — SIGNIFICANT CHANGE UP (ref 3.8–10.5)
WBC # FLD AUTO: 6.22 K/UL — SIGNIFICANT CHANGE UP (ref 3.8–10.5)

## 2021-06-11 RX ADMIN — PANTOPRAZOLE SODIUM 40 MILLIGRAM(S): 20 TABLET, DELAYED RELEASE ORAL at 05:41

## 2021-06-11 RX ADMIN — PIPERACILLIN AND TAZOBACTAM 25 GRAM(S): 4; .5 INJECTION, POWDER, LYOPHILIZED, FOR SOLUTION INTRAVENOUS at 14:00

## 2021-06-11 RX ADMIN — OXYCODONE AND ACETAMINOPHEN 1 TABLET(S): 5; 325 TABLET ORAL at 09:28

## 2021-06-11 RX ADMIN — SODIUM CHLORIDE 75 MILLILITER(S): 9 INJECTION INTRAMUSCULAR; INTRAVENOUS; SUBCUTANEOUS at 05:42

## 2021-06-11 RX ADMIN — OXYCODONE AND ACETAMINOPHEN 1 TABLET(S): 5; 325 TABLET ORAL at 10:15

## 2021-06-11 RX ADMIN — Medication 125 MICROGRAM(S): at 05:41

## 2021-06-11 RX ADMIN — OXYCODONE AND ACETAMINOPHEN 1 TABLET(S): 5; 325 TABLET ORAL at 05:41

## 2021-06-11 RX ADMIN — ENOXAPARIN SODIUM 40 MILLIGRAM(S): 100 INJECTION SUBCUTANEOUS at 09:28

## 2021-06-11 RX ADMIN — PIPERACILLIN AND TAZOBACTAM 25 GRAM(S): 4; .5 INJECTION, POWDER, LYOPHILIZED, FOR SOLUTION INTRAVENOUS at 05:41

## 2021-06-11 RX ADMIN — OXYCODONE AND ACETAMINOPHEN 1 TABLET(S): 5; 325 TABLET ORAL at 06:11

## 2021-06-11 NOTE — DISCHARGE NOTE PROVIDER - CARE PROVIDER_API CALL
Garcia Higginbotham)  Surgery; Surgical Critical Care  [address] [place] [address]  Phone: [phone]  Fax: [phone]  Follow Up Time: 1 week

## 2021-06-11 NOTE — DISCHARGE NOTE NURSING/CASE MANAGEMENT/SOCIAL WORK - PATIENT PORTAL LINK FT
You can access the FollowMyHealth Patient Portal offered by [facility] by registering at the following website: [email or web address]. By joining [employer]’s FollowMyHealth portal, you will also be able to view your health information using other applications (apps) compatible with our system.

## 2021-06-11 NOTE — DISCHARGE NOTE PROVIDER - NSDCFUADDINST_GEN_ALL_CORE_FT
Please resume regular activities and diet as tolerated. Patient may shower with running water/soap, but please do not take bath or swim  No lifting more than 10-lb for 6 weeks and no contact sports or strenuous activities for 6 weeks

## 2021-06-11 NOTE — DISCHARGE NOTE PROVIDER - HOSPITAL COURSE
67yo F presents w/ acute appendicitis s/p laparoscopic appendectomy. Treated w/ IV abx, no leukocytosis. Tolerating diet, denies fever/chills, shortness of breath, chest pain. VS reviewed.

## 2021-06-11 NOTE — DISCHARGE NOTE PROVIDER - NSDCMRMEDTOKEN_GEN_ALL_CORE_FT
acetaminophen 650 mg oral tablet: 2 tab(s) orally 4 times a day, As Needed - for moderate pain  ascorbic acid 500 mg oral tablet: 1 tab(s) orally once a day  Centrum Women&#x27;s oral tablet: 1 tab(s) orally once a day  cholecalciferol 2000 intl units (50 mcg) oral tablet: 1 tab(s) orally once a day  levothyroxine 125 mcg (0.125 mg) oral tablet: 1 tab(s) orally once a day  omeprazole 20 mg oral delayed release capsule: 1 cap(s) orally once a day  vitamin E 400 intl units oral capsule: 1 cap(s) orally once a day

## 2021-06-11 NOTE — DISCHARGE NOTE PROVIDER - NSDCCPCAREPLAN_GEN_ALL_CORE_FT
PRINCIPAL DISCHARGE DIAGNOSIS  Diagnosis: Abdominal pain  Assessment and Plan of Treatment:       SECONDARY DISCHARGE DIAGNOSES  Diagnosis: Appendicitis, acute  Assessment and Plan of Treatment:

## 2021-06-12 LAB
CULTURE RESULTS: SIGNIFICANT CHANGE UP
SPECIMEN SOURCE: SIGNIFICANT CHANGE UP

## 2021-06-18 DIAGNOSIS — K35.32 ACUTE APPENDICITIS WITH PERFORATION, LOCALIZED PERITONITIS, AND GANGRENE, WITHOUT ABSCESS: ICD-10-CM

## 2021-06-18 DIAGNOSIS — Z87.442 PERSONAL HISTORY OF URINARY CALCULI: ICD-10-CM

## 2021-06-18 DIAGNOSIS — Z85.118 PERSONAL HISTORY OF OTHER MALIGNANT NEOPLASM OF BRONCHUS AND LUNG: ICD-10-CM

## 2021-06-18 DIAGNOSIS — Z90.49 ACQUIRED ABSENCE OF OTHER SPECIFIED PARTS OF DIGESTIVE TRACT: ICD-10-CM

## 2021-06-18 DIAGNOSIS — J44.9 CHRONIC OBSTRUCTIVE PULMONARY DISEASE, UNSPECIFIED: ICD-10-CM

## 2021-06-18 DIAGNOSIS — Z90.2 ACQUIRED ABSENCE OF LUNG [PART OF]: ICD-10-CM

## 2021-06-18 DIAGNOSIS — K21.9 GASTRO-ESOPHAGEAL REFLUX DISEASE WITHOUT ESOPHAGITIS: ICD-10-CM

## 2021-06-22 LAB
C DIFF TOXIN B QL PCR REFLEX: NORMAL
GDH ANTIGEN: NOT DETECTED
TOXIN A AND B: NOT DETECTED

## 2021-06-25 ENCOUNTER — APPOINTMENT (OUTPATIENT)
Dept: GASTROENTEROLOGY | Facility: CLINIC | Age: 66
End: 2021-06-25
Payer: MEDICARE

## 2021-06-25 VITALS
WEIGHT: 171 LBS | HEART RATE: 61 BPM | HEIGHT: 64 IN | DIASTOLIC BLOOD PRESSURE: 73 MMHG | SYSTOLIC BLOOD PRESSURE: 117 MMHG | BODY MASS INDEX: 29.19 KG/M2

## 2021-06-25 DIAGNOSIS — R19.7 DIARRHEA, UNSPECIFIED: ICD-10-CM

## 2021-06-25 PROCEDURE — 99213 OFFICE O/P EST LOW 20 MIN: CPT

## 2021-06-25 NOTE — HISTORY OF PRESENT ILLNESS
[de-identified] : Ms. LORNA ESQUIVEL is a 66 year old female with history of recent appendectomy requiring antibiotics. Patient developed profuse diarrhea subsequently. C diff was negative, and patient stopped Flagyl. Patient feel improved using prn Imodium. Issues about antibiotic associated diarrhea discussed. \par

## 2021-06-25 NOTE — ASSESSMENT
[FreeTextEntry1] : 67 yo female with improving diarrhea. Will use probiotics and Imodium. Patient to call back with any changes.

## 2021-08-29 ENCOUNTER — RX RENEWAL (OUTPATIENT)
Age: 66
End: 2021-08-29

## 2021-08-30 ENCOUNTER — NON-APPOINTMENT (OUTPATIENT)
Age: 66
End: 2021-08-30

## 2021-09-02 ENCOUNTER — APPOINTMENT (OUTPATIENT)
Dept: GASTROENTEROLOGY | Facility: CLINIC | Age: 66
End: 2021-09-02
Payer: MEDICARE

## 2021-09-02 VITALS
SYSTOLIC BLOOD PRESSURE: 154 MMHG | WEIGHT: 170 LBS | BODY MASS INDEX: 30.12 KG/M2 | HEIGHT: 63 IN | DIASTOLIC BLOOD PRESSURE: 105 MMHG | HEART RATE: 71 BPM

## 2021-09-02 PROCEDURE — 99213 OFFICE O/P EST LOW 20 MIN: CPT

## 2021-09-02 NOTE — ASSESSMENT
[FreeTextEntry1] : 67 yo female with history of GERD symptoms and dysphagia. Will increase PPI to bid and arrange for upper endoscopy.

## 2021-09-02 NOTE — HISTORY OF PRESENT ILLNESS
[de-identified] : Ms. LORNA ESQUIVEL is a 66 year old female with history of GERD which has been maintained on daily omeprazole. Patient has noted increasing symptoms and recently has noted some dysphagia. There has been no weight loss. No other significant changes in medical history. Diarrhea is controlled with the use of weekly Imodium.\par

## 2021-10-01 ENCOUNTER — RX RENEWAL (OUTPATIENT)
Age: 66
End: 2021-10-01

## 2021-10-31 ENCOUNTER — APPOINTMENT (OUTPATIENT)
Dept: DISASTER EMERGENCY | Facility: CLINIC | Age: 66
End: 2021-10-31

## 2021-11-04 ENCOUNTER — APPOINTMENT (OUTPATIENT)
Dept: GASTROENTEROLOGY | Facility: AMBULATORY MEDICAL SERVICES | Age: 66
End: 2021-11-04

## 2021-11-04 ENCOUNTER — APPOINTMENT (OUTPATIENT)
Dept: FAMILY MEDICINE | Facility: CLINIC | Age: 66
End: 2021-11-04
Payer: MEDICARE

## 2021-11-04 PROCEDURE — 90686 IIV4 VACC NO PRSV 0.5 ML IM: CPT

## 2021-11-04 PROCEDURE — G0008: CPT

## 2021-11-08 ENCOUNTER — NON-APPOINTMENT (OUTPATIENT)
Age: 66
End: 2021-11-08

## 2021-11-08 LAB
25(OH)D3 SERPL-MCNC: 38.6 NG/ML
ALBUMIN SERPL ELPH-MCNC: 4.8 G/DL
ALP BLD-CCNC: 81 U/L
ALT SERPL-CCNC: 23 U/L
ANION GAP SERPL CALC-SCNC: 13 MMOL/L
AST SERPL-CCNC: 24 U/L
BILIRUB SERPL-MCNC: 0.4 MG/DL
BUN SERPL-MCNC: 16 MG/DL
CALCIUM SERPL-MCNC: 10.4 MG/DL
CHLORIDE SERPL-SCNC: 100 MMOL/L
CHOLEST SERPL-MCNC: 209 MG/DL
CO2 SERPL-SCNC: 23 MMOL/L
CREAT SERPL-MCNC: 0.74 MG/DL
GLUCOSE SERPL-MCNC: 88 MG/DL
HDLC SERPL-MCNC: 68 MG/DL
LDLC SERPL CALC-MCNC: 114 MG/DL
NONHDLC SERPL-MCNC: 142 MG/DL
POTASSIUM SERPL-SCNC: 4.2 MMOL/L
PROT SERPL-MCNC: 7 G/DL
SODIUM SERPL-SCNC: 137 MMOL/L
T4 FREE SERPL-MCNC: 1.5 NG/DL
TRIGL SERPL-MCNC: 139 MG/DL
TSH SERPL-ACNC: 12.6 UIU/ML

## 2021-11-08 RX ORDER — LEVOTHYROXINE SODIUM 0.15 MG/1
150 TABLET ORAL
Qty: 90 | Refills: 0 | Status: DISCONTINUED | COMMUNITY
Start: 2017-04-26 | End: 2021-11-08

## 2021-11-15 ENCOUNTER — APPOINTMENT (OUTPATIENT)
Dept: FAMILY MEDICINE | Facility: CLINIC | Age: 66
End: 2021-11-15

## 2021-11-19 DIAGNOSIS — Z80.1 FAMILY HISTORY OF MALIGNANT NEOPLASM OF TRACHEA, BRONCHUS AND LUNG: ICD-10-CM

## 2021-11-19 DIAGNOSIS — M25.559 PAIN IN UNSPECIFIED HIP: ICD-10-CM

## 2021-11-19 DIAGNOSIS — F41.1 GENERALIZED ANXIETY DISORDER: ICD-10-CM

## 2021-11-19 DIAGNOSIS — Z86.69 PERSONAL HISTORY OF OTHER DISEASES OF THE NERVOUS SYSTEM AND SENSE ORGANS: ICD-10-CM

## 2021-11-19 DIAGNOSIS — Z82.49 FAMILY HISTORY OF ISCHEMIC HEART DISEASE AND OTHER DISEASES OF THE CIRCULATORY SYSTEM: ICD-10-CM

## 2021-11-19 DIAGNOSIS — J30.1 ALLERGIC RHINITIS DUE TO POLLEN: ICD-10-CM

## 2021-11-19 DIAGNOSIS — Z86.19 PERSONAL HISTORY OF OTHER INFECTIOUS AND PARASITIC DISEASES: ICD-10-CM

## 2021-11-20 PROBLEM — Z00.00 ENCOUNTER FOR PREVENTIVE HEALTH EXAMINATION: Status: ACTIVE | Noted: 2017-06-19

## 2021-11-20 PROBLEM — E03.9 HYPOTHYROIDISM: Status: ACTIVE | Noted: 2019-04-24

## 2021-11-20 PROBLEM — K21.9 GERD (GASTROESOPHAGEAL REFLUX DISEASE): Status: ACTIVE | Noted: 2021-05-21

## 2021-11-20 RX ORDER — METRONIDAZOLE 500 MG/1
500 TABLET ORAL TWICE DAILY
Qty: 20 | Refills: 1 | Status: DISCONTINUED | COMMUNITY
Start: 2021-06-18 | End: 2021-11-20

## 2021-11-20 RX ORDER — SODIUM SULFATE, POTASSIUM SULFATE, MAGNESIUM SULFATE 17.5; 3.13; 1.6 G/ML; G/ML; G/ML
17.5-3.13-1.6 SOLUTION, CONCENTRATE ORAL
Qty: 1 | Refills: 0 | Status: DISCONTINUED | COMMUNITY
Start: 2019-05-01 | End: 2021-11-20

## 2021-11-20 RX ORDER — POLYETHYLENE GLYCOL 3350, SODIUM SULFATE ANHYDROUS, SODIUM BICARBONATE, SODIUM CHLORIDE, POTASSIUM CHLORIDE 227.1; 21.5; 6.36; 5.53; .754 G/L; G/L; G/L; G/L; G/L
227.1 POWDER, FOR SOLUTION ORAL
Qty: 1 | Refills: 0 | Status: DISCONTINUED | COMMUNITY
Start: 2019-05-09 | End: 2021-11-20

## 2021-11-22 ENCOUNTER — APPOINTMENT (OUTPATIENT)
Dept: DERMATOLOGY | Facility: CLINIC | Age: 66
End: 2021-11-22
Payer: MEDICARE

## 2021-11-22 ENCOUNTER — APPOINTMENT (OUTPATIENT)
Dept: FAMILY MEDICINE | Facility: CLINIC | Age: 66
End: 2021-11-22
Payer: MEDICARE

## 2021-11-22 VITALS
TEMPERATURE: 98.8 F | OXYGEN SATURATION: 98 % | SYSTOLIC BLOOD PRESSURE: 120 MMHG | HEART RATE: 78 BPM | WEIGHT: 175 LBS | HEIGHT: 63 IN | BODY MASS INDEX: 31.01 KG/M2 | DIASTOLIC BLOOD PRESSURE: 68 MMHG

## 2021-11-22 DIAGNOSIS — D17.0 BENIGN LIPOMATOUS NEOPLASM OF SKIN AND SUBCUTANEOUS TISSUE OF HEAD, FACE AND NECK: ICD-10-CM

## 2021-11-22 DIAGNOSIS — K21.9 GASTRO-ESOPHAGEAL REFLUX DISEASE W/OUT ESOPHAGITIS: ICD-10-CM

## 2021-11-22 DIAGNOSIS — E03.9 HYPOTHYROIDISM, UNSPECIFIED: ICD-10-CM

## 2021-11-22 DIAGNOSIS — Z00.00 ENCOUNTER FOR GENERAL ADULT MEDICAL EXAMINATION W/OUT ABNORMAL FINDINGS: ICD-10-CM

## 2021-11-22 DIAGNOSIS — D22.9 MELANOCYTIC NEVI, UNSPECIFIED: ICD-10-CM

## 2021-11-22 DIAGNOSIS — L82.1 OTHER SEBORRHEIC KERATOSIS: ICD-10-CM

## 2021-11-22 PROCEDURE — 99213 OFFICE O/P EST LOW 20 MIN: CPT

## 2021-11-22 PROCEDURE — G0439: CPT

## 2021-11-22 RX ORDER — LEVOTHYROXINE SODIUM 0.11 MG/1
112 TABLET ORAL
Qty: 90 | Refills: 3 | Status: ACTIVE | COMMUNITY
Start: 2021-08-29 | End: 1900-01-01

## 2021-11-22 NOTE — HISTORY OF PRESENT ILLNESS
[FreeTextEntry1] : LORNA ESQUIVEL is a 66 year old female here for a physical exam.  [de-identified] : Her last PE was 11/2020\par Her last tetanus shot was 8/28/17\par She has had Prevnar and Pneumovax \par She has had Shingrix \par She has had the COVID vaccine\par Her last dentist visit was less than 6 months ago \par Her last eye doctor appointment was less than one year ago \par Her last dermatologist visit was less than one year ago \par Her diet is healthy overall\par Exercise: rarely\par Her last GYN visit was less than one year ago \par Her last mammogram was less than one year ago \par Her last colonoscopy was 6/13/19, repeat 10 years\par Her last DEXA was 10/28/2020 (osteopenia)

## 2021-11-22 NOTE — PLAN
[FreeTextEntry1] : Reviewed age-appropriate preventive screening tests with patient. She had a flu shot already this year. She is up to date on all other vaccines and recommended preventive testing.\par \par Discussed clean eating (e.g. Mediterranean style diet) and recommendations for regular exercise/staying as physically active as possible.\par \par Reviewed importance of good self care (e.g. meditation, yoga, adequate rest, regular exercise, magnesium, clean eating, etc.).

## 2021-11-22 NOTE — PHYSICAL EXAM
[Full Body Skin Exam Performed] : performed [FreeTextEntry3] : Skin examination performed of the face, neck, trunk, arms, legs; \par The patient is well, alert and oriented, pleasant and cooperative.\par Eyelids, conjunctivae, oral mucosa, digits and nails all normal.  \par No cervical adenopathy.\par \par Normal findings include:\par \par Seborrheic keratoses- few darker lesions on back\par Angiomas\par Lentigines\par Mobile subcutaneous nodule with normal overlying skin- non tender;\par Right forehead\par \par No lesions were suspicious for malignancy. \par \par

## 2021-11-22 NOTE — ASSESSMENT
[FreeTextEntry1] : LORNA ESQUIVEL is a 66 year old female here for a physical exam. She is also here to follow up on the above listed conditions. \par \par Labs were done prior. Her CMP, Lipids, and vitamin D are within normal limits. Her TSH is elevated but her Free T4 is normal. Earlier this year her TSH was low and her Free T4 was elevated. At that time her Levothyroxine dose was decreased, first from 137 to 125 mcg and then to 112 mcg which is her current dose. Since her Free T4 is normal, even though the TSH is high, I would recommend continuing the same dose of Levothyroxine. \par \par She sees a cardiologist (Dr. Darnell) and does not need an EKG today.

## 2021-11-22 NOTE — ASSESSMENT
[FreeTextEntry1] : Complete skin examination is negative for malignancy; Multiple new concerns were addressed and discussed.\par Therapeutic options and their risks and benefits; along with multiple diagnostic possibilities were discussed at length;\par risks and benefits of skin biopsy and/or other further study were discussed;\par \par Lipoma R forehead;  no tx needed; asymptomatic; discussed plastics eval only if +Sx\par Follow up for TBSE in 1 year

## 2021-11-29 DIAGNOSIS — Z11.59 ENCOUNTER FOR SCREENING FOR OTHER VIRAL DISEASES: ICD-10-CM

## 2021-12-01 ENCOUNTER — APPOINTMENT (OUTPATIENT)
Dept: FAMILY MEDICINE | Facility: CLINIC | Age: 66
End: 2021-12-01
Payer: MEDICARE

## 2021-12-01 PROCEDURE — 36415 COLL VENOUS BLD VENIPUNCTURE: CPT

## 2021-12-02 ENCOUNTER — TRANSCRIPTION ENCOUNTER (OUTPATIENT)
Age: 66
End: 2021-12-02

## 2021-12-02 LAB
COVID-19 SPIKE DOMAIN ANTIBODY INTERPRETATION: POSITIVE
SARS-COV-2 AB SERPL IA-ACNC: >250 U/ML

## 2021-12-05 ENCOUNTER — FORM ENCOUNTER (OUTPATIENT)
Age: 66
End: 2021-12-05

## 2021-12-10 ENCOUNTER — APPOINTMENT (OUTPATIENT)
Dept: FAMILY MEDICINE | Facility: CLINIC | Age: 66
End: 2021-12-10
Payer: MEDICARE

## 2021-12-10 DIAGNOSIS — J06.9 ACUTE UPPER RESPIRATORY INFECTION, UNSPECIFIED: ICD-10-CM

## 2021-12-10 PROCEDURE — 99213 OFFICE O/P EST LOW 20 MIN: CPT

## 2021-12-13 LAB
BACTERIA THROAT CULT: NORMAL
SARS-COV-2 N GENE NPH QL NAA+PROBE: NOT DETECTED

## 2021-12-13 NOTE — HISTORY OF PRESENT ILLNESS
[FreeTextEntry8] : Patient presenting for symptoms of sore throat, would like to be tested for COVID.\par \par Had COVID infection in March 2020. \par Vaccinated for COVID 2021.\par Flu vaccinated.

## 2021-12-13 NOTE — PLAN
[FreeTextEntry1] : Will f/u with results. \par Counseled on symptomatic management (rest, hydrate, Tylenol/Advil as needed).\par Return if no improvement noted.\par

## 2021-12-22 ENCOUNTER — NON-APPOINTMENT (OUTPATIENT)
Age: 66
End: 2021-12-22

## 2021-12-22 ENCOUNTER — APPOINTMENT (OUTPATIENT)
Age: 66
End: 2021-12-22
Payer: MEDICARE

## 2021-12-22 DIAGNOSIS — M25.572 PAIN IN RIGHT ANKLE AND JOINTS OF RIGHT FOOT: ICD-10-CM

## 2021-12-22 DIAGNOSIS — M79.672 PAIN IN RIGHT FOOT: ICD-10-CM

## 2021-12-22 DIAGNOSIS — M25.571 PAIN IN RIGHT ANKLE AND JOINTS OF RIGHT FOOT: ICD-10-CM

## 2021-12-22 DIAGNOSIS — M72.2 PLANTAR FASCIAL FIBROMATOSIS: ICD-10-CM

## 2021-12-22 DIAGNOSIS — M79.671 PAIN IN RIGHT FOOT: ICD-10-CM

## 2021-12-22 PROCEDURE — 99204 OFFICE O/P NEW MOD 45 MIN: CPT

## 2021-12-22 NOTE — HISTORY OF PRESENT ILLNESS
[FreeTextEntry1] : 12/22/2021: The patient is a 66 year old female presenting for an initial evaluation of bilateral plantar fascitis, ongoing for the past 6 months. She has been evaluated for this issue by 2 outside podiatrists. She has seen Dr. Galdamez for this issue, who prescribed the patient with a course of physical therapy and the utilization of a gel heel cups. She denies any prior injection to the foot. She does state that she had prior cortisone injections to the hip, with gave her significants relief for approximately 4-6 weeks. The patient has performed a home exercise/physical therapy program for 1 month 2x weekly which consisted of strengthening and stretching, with worsening noted. She has tried oral anti-inflammatory use and Tylenol without relief. She states that stairs worsen her pain. She does report intermittent numbness and tingling sensations to the right foot.  She presents wearing sneakers. No other complaints. \par

## 2021-12-22 NOTE — ADDENDUM
[FreeTextEntry1] : I, Constanza Rodriguez, acted solely as a scribe for Dr. Sherwin Suarez on this date 12/22/2021.\par \par All medical record entries made by the Scribe were at my, Dr. Sherwin Suarez, direction and personally dictated by me on 12/22/2021 . I have reviewed the chart and agree that the record accurately reflects my personal performance of the history, physical exam, assessment and plan. I have also personally directed, reviewed, and agreed with the chart.	\par

## 2021-12-22 NOTE — PHYSICAL EXAM
[de-identified] : General: Alert and oriented x3. In no acute distress. Pleasant in nature with a normal affect. No apparent respiratory distress.\par \par Left Foot Exam\par Skin: Clean, dry, intact\par Inspection: No obvious malalignment, no masses, no swelling, no effusion\par Pulses: 2+ DP/PT pulses\par ROM: FOOT Full  ROM of digits, ANKLE 10 degrees of dorsiflexion, 40 degrees of plantarflexion, 10 degrees of subtalar motion.\par Painful ROM: None\par Tenderness: No tenderness over the medial malleolus, No tenderness over the lateral malleolus, + CFL/ATFL/PTFL pain, no deltoid ligament pain. No heel pain. No Achilles tenderness. No 5th metatarsal pain. No pain to the LisFranc joint. No ttp over the posterior tibial tendon.\par Stability: Negative anterior/posterior drawer.\par Strength: 5/5 ADD/ABD/TA/GS/EHL/FHL/EDL\par Neuro: Sensation in tact to light touch throughout\par Additional tests: Negative Mortons test, negative tarsal tunnel tinels, negative single heel rise.	\par \par Right Foot Exam\par Skin: Clean, dry, intact\par Inspection: No obvious malalignment, no masses, no swelling, no effusion\par Pulses: 2+ DP/PT pulses\par ROM: FOOT Full  ROM of digits, ANKLE 10 degrees of dorsiflexion, 40 degrees of plantarflexion, 10 degrees of subtalar motion.\par Painful ROM: None\par Tenderness: No tenderness over the medial malleolus, No tenderness over the lateral malleolus, no CFL/ATFL/PTFL pain, no deltoid ligament pain. + heel pain. No Achilles tenderness. No 5th metatarsal pain. No pain to the LisFranc joint. No ttp over the posterior tibial tendon.\par Stability: Negative anterior/posterior drawer.\par Strength: 5/5 ADD/ABD/TA/GS/EHL/FHL/EDL\par Neuro: Sensation in tact to light touch throughout\par Additional tests: Negative Mortons test, negative tarsal tunnel tinels, negative single heel rise.			 [de-identified] : MRI of right hindfoot done on 12/02/2021 at [facility] results reviewed 12/22/2021 , results as reported in the chart:\par \par Impressions:\par 1. Moderate plantar fascitis. No plantar fascial tear. Reactive marrow edema at the calcaneus. \par 2. Remote osteochondral injury at the medial talar dome with associated subchondral cystic blum and edema. No asosicated subarticular collapse. \par 3. Focal low-grade partial tear of the peroneus brevis tendon. \par 4. Remote low-grade partial tears of the anterior talofibular ligament and superficial deltoid ligament. \par 5. Mild tibiotalar joint arthrosis. \par \par MRI of left hindfoot done on 12/02/2021 at [facility] results reviewed 12/22/2021 , results as reported in the chart:\par \par Impressions:\par 1. Moderate plantar fascitis. Low-grade partial tear at the central cord origin. Reactive marrow edema at the calcaneus.\par 2. Mild Achilles tendinosis. \par 3. Low-grade partial tear of the peroneus brevis tendon as described.

## 2021-12-22 NOTE — DISCUSSION/SUMMARY
[de-identified] : Assessment: Bilateral foot Plantar Fasciitis\par \par Plan:\par #1. Anti-inflammatories/Tylenol as needed for pain.\par #2. Home stretching program/physical therapy prescription given. Advised patient to avoid high impact activities with respect to the treadmill. \par #3. A discussion was had about a possible US guided IR cortisone injection for the right foot. The patient would like to move forward with the procedure. The injection was ordered for the patient in the office today. 		\par #4. Dr. Suero's insoles/ gel heel cups.\par #5. I advised the patient to utilize a frozen water bottle or golf ball as a foot roller/massager.		\par #6. Epsom Salt Baths daily\par #7.Dorsal Night Splint. Use as instructed/as directed.\par #8. All Questions answered. The patient understood the treatment plan above. Follow up as needed. \par \par **We did have a discussion with respect to possible PRP injections versus possible Shock-wave therapy if there is no improvement upon further evaluation.  \par 			\par

## 2022-01-26 ENCOUNTER — APPOINTMENT (OUTPATIENT)
Dept: ULTRASOUND IMAGING | Facility: CLINIC | Age: 67
End: 2022-01-26

## 2022-04-11 PROBLEM — Z11.59 SCREENING FOR VIRAL DISEASE: Status: ACTIVE | Noted: 2021-11-29

## 2022-09-29 ENCOUNTER — FORM ENCOUNTER (OUTPATIENT)
Age: 67
End: 2022-09-29

## 2022-10-03 NOTE — PATIENT PROFILE ADULT - BRAND OF COVID-19 VACCINATION
For information on Fall & Injury Prevention, visit: [email or web address] OR  [email or web address] OR  https://www.cdc.gov/steadi/patient.html
Pf[employer] dose 1 and 2

## 2023-01-08 ENCOUNTER — FORM ENCOUNTER (OUTPATIENT)
Age: 68
End: 2023-01-08

## 2023-01-11 ENCOUNTER — FORM ENCOUNTER (OUTPATIENT)
Age: 68
End: 2023-01-11

## 2023-01-12 ENCOUNTER — FORM ENCOUNTER (OUTPATIENT)
Age: 68
End: 2023-01-12

## 2023-01-30 ENCOUNTER — FORM ENCOUNTER (OUTPATIENT)
Age: 68
End: 2023-01-30

## 2023-02-02 ENCOUNTER — FORM ENCOUNTER (OUTPATIENT)
Age: 68
End: 2023-02-02

## 2023-02-20 PROBLEM — N95.2 ATROPHY OF VAGINA: Status: ACTIVE | Noted: 2023-02-20

## 2023-02-27 ENCOUNTER — RESULT CHARGE (OUTPATIENT)
Age: 68
End: 2023-02-27

## 2023-02-27 ENCOUNTER — APPOINTMENT (OUTPATIENT)
Dept: OBGYN | Facility: CLINIC | Age: 68
End: 2023-02-27
Payer: MEDICARE

## 2023-02-27 VITALS
SYSTOLIC BLOOD PRESSURE: 146 MMHG | BODY MASS INDEX: 27.1 KG/M2 | DIASTOLIC BLOOD PRESSURE: 88 MMHG | WEIGHT: 153 LBS | HEART RATE: 74 BPM

## 2023-02-27 DIAGNOSIS — N95.2 POSTMENOPAUSAL ATROPHIC VAGINITIS: ICD-10-CM

## 2023-02-27 LAB
BILIRUB UR QL STRIP: NORMAL
CLARITY UR: CLEAR
COLLECTION METHOD: NORMAL
DATE COLLECTED: NORMAL
GLUCOSE UR-MCNC: NORMAL
HCG UR QL: 0.2 EU/DL
HEMOCCULT SP1 STL QL: NEGATIVE
HGB UR QL STRIP.AUTO: NORMAL
KETONES UR-MCNC: NORMAL
LEUKOCYTE ESTERASE UR QL STRIP: NORMAL
NITRITE UR QL STRIP: NORMAL
PH UR STRIP: 6
PROT UR STRIP-MCNC: NORMAL
QUALITY CONTROL: YES
SP GR UR STRIP: 1.02

## 2023-02-27 PROCEDURE — 82270 OCCULT BLOOD FECES: CPT

## 2023-02-27 PROCEDURE — 81003 URINALYSIS AUTO W/O SCOPE: CPT | Mod: QW

## 2023-02-27 PROCEDURE — G0101: CPT

## 2023-03-01 LAB — CYTOLOGY CVX/VAG DOC THIN PREP: ABNORMAL

## 2023-03-01 NOTE — DISCUSSION/SUMMARY
[FreeTextEntry1] : Patient to follow up in 1 year for annual GYN exam\par Mammogram  due: now; Rx given\par Colonoscopy due: 02/2030- pt states she was told shes done for life \par Follows with colorectal for hemorrhoids. \par Bone density due: 10/2023; Rx given\par Pap ordered\par \par Hemoccult ordered\par All questions answered, patient is agreeable with plan.\par PMB precautions reviewed\par vaginal lubricants PRN\par \par \par I Lilli PASTOR am scribing for the presence of Dr. Costa the following sections HISTORY OF PRESENT ILLNESS, PAST MEDICAL/FAMILY/SOCIAL HISTORY; REVIEW OF SYSTEMS; VITAL SIGNS; PHYSICAL EXAM; DISPOSITION. \par \par \par I personally performed the services described in the documentation, reviewed the documentation recorded by the scribe in my presence and it accurately and completely records my words and actions.\par \par

## 2023-03-01 NOTE — PHYSICAL EXAM
[Chaperone Present] : A chaperone was present in the examining room during all aspects of the physical examination [Appropriately responsive] : appropriately responsive [Alert] : alert [No Acute Distress] : no acute distress [No Lymphadenopathy] : no lymphadenopathy [Soft] : soft [Non-tender] : non-tender [Non-distended] : non-distended [No HSM] : No HSM [No Lesions] : no lesions [No Mass] : no mass [Oriented x3] : oriented x3 [Examination Of The Breasts] : a normal appearance [No Masses] : no breast masses were palpable [Labia Majora] : normal [Labia Minora] : normal [Atrophy] : atrophy [Normal] : normal [Uterine Adnexae] : normal [Vulvar Atrophy] : vulvar atrophy [FreeTextEntry1] : NABOR BourneC

## 2023-03-27 ENCOUNTER — EMERGENCY (EMERGENCY)
Facility: HOSPITAL | Age: 68
LOS: 1 days | Discharge: HOME | End: 2023-03-27
Payer: MEDICARE

## 2023-03-27 DIAGNOSIS — Z85.118 PERSONAL HISTORY OF OTHER MALIGNANT NEOPLASM OF BRONCHUS AND LUNG: ICD-10-CM

## 2023-03-27 DIAGNOSIS — C79.52 SECONDARY MALIGNANT NEOPLASM OF BONE MARROW: ICD-10-CM

## 2023-03-27 DIAGNOSIS — Z90.2 ACQUIRED ABSENCE OF LUNG [PART OF]: ICD-10-CM

## 2023-03-27 DIAGNOSIS — R07.9 CHEST PAIN, UNSPECIFIED: ICD-10-CM

## 2023-03-27 DIAGNOSIS — R06.02 SHORTNESS OF BREATH: ICD-10-CM

## 2023-03-27 PROCEDURE — 71275 CT ANGIOGRAPHY CHEST: CPT

## 2023-03-27 PROCEDURE — 93005 ELECTROCARDIOGRAM TRACING: CPT

## 2023-03-27 PROCEDURE — 99285 EMERGENCY DEPT VISIT HI MDM: CPT

## 2023-03-27 PROCEDURE — 93010 ELECTROCARDIOGRAM REPORT: CPT

## 2023-03-27 PROCEDURE — 85027 COMPLETE CBC AUTOMATED: CPT

## 2023-03-27 PROCEDURE — 36415 COLL VENOUS BLD VENIPUNCTURE: CPT

## 2023-03-27 PROCEDURE — 80053 COMPREHEN METABOLIC PANEL: CPT

## 2023-03-27 PROCEDURE — 85730 THROMBOPLASTIN TIME PARTIAL: CPT

## 2023-03-27 PROCEDURE — 71275 CT ANGIOGRAPHY CHEST: CPT | Mod: 26,MH

## 2023-03-27 PROCEDURE — 85610 PROTHROMBIN TIME: CPT

## 2023-03-27 PROCEDURE — 84484 ASSAY OF TROPONIN QUANT: CPT

## 2023-03-27 PROCEDURE — 99285 EMERGENCY DEPT VISIT HI MDM: CPT | Mod: 25

## 2023-03-27 NOTE — ED PROVIDER NOTE - PROGRESS NOTE DETAILS
Constitutional: NAD AAOx3  Eyes: PERRLA EOMI  Head: Normocephalic atraumatic  Mouth: MMM  Cardiac: regular rate   Resp: Lungs CTAB  GI: Abd s/nt/nd, no rebound or guarding.  Neuro: awake, alert, moving all extremities see down time chart    Constitutional: NAD AAOx3  Eyes: PERRLA EOMI  Head: Normocephalic atraumatic  Mouth: MMM  Cardiac: regular rate   Resp: Lungs CTAB  GI: Abd s/nt/nd, no rebound or guarding.  Neuro: awake, alert, moving all extremities     Keli Fowler MD,    The history, relevant review of systems, past medical and surgical history, medical decision making, and physical examination was documented by the scribe in my presence and I attest to the accuracy of the documentation.

## 2023-04-12 ENCOUNTER — APPOINTMENT (OUTPATIENT)
Dept: INTERNAL MEDICINE | Facility: CLINIC | Age: 68
End: 2023-04-12

## 2023-04-22 ENCOUNTER — EMERGENCY (EMERGENCY)
Facility: HOSPITAL | Age: 68
LOS: 0 days | Discharge: HOME | End: 2023-04-22
Attending: EMERGENCY MEDICINE
Payer: MEDICARE

## 2023-04-22 VITALS — WEIGHT: 153 LBS | HEIGHT: 64 IN

## 2023-04-22 VITALS
SYSTOLIC BLOOD PRESSURE: 132 MMHG | HEART RATE: 79 BPM | TEMPERATURE: 98 F | DIASTOLIC BLOOD PRESSURE: 69 MMHG | RESPIRATION RATE: 16 BRPM | OXYGEN SATURATION: 100 %

## 2023-04-22 DIAGNOSIS — Z85.118 PERSONAL HISTORY OF OTHER MALIGNANT NEOPLASM OF BRONCHUS AND LUNG: ICD-10-CM

## 2023-04-22 DIAGNOSIS — R42 DIZZINESS AND GIDDINESS: ICD-10-CM

## 2023-04-22 DIAGNOSIS — Z85.830 PERSONAL HISTORY OF MALIGNANT NEOPLASM OF BONE: ICD-10-CM

## 2023-04-22 DIAGNOSIS — D64.9 ANEMIA, UNSPECIFIED: ICD-10-CM

## 2023-04-22 DIAGNOSIS — D69.6 THROMBOCYTOPENIA, UNSPECIFIED: ICD-10-CM

## 2023-04-22 DIAGNOSIS — R53.1 WEAKNESS: ICD-10-CM

## 2023-04-22 LAB
ALBUMIN SERPL ELPH-MCNC: 3.8 G/DL — SIGNIFICANT CHANGE UP (ref 3.3–5)
ALP SERPL-CCNC: 178 U/L — HIGH (ref 40–120)
ALT FLD-CCNC: 33 U/L — SIGNIFICANT CHANGE UP (ref 12–78)
ANION GAP SERPL CALC-SCNC: 2 MMOL/L — LOW (ref 5–17)
ANISOCYTOSIS BLD QL: SLIGHT — SIGNIFICANT CHANGE UP
APPEARANCE UR: CLEAR — SIGNIFICANT CHANGE UP
APTT BLD: 33.7 SEC — SIGNIFICANT CHANGE UP (ref 27.5–35.5)
AST SERPL-CCNC: 21 U/L — SIGNIFICANT CHANGE UP (ref 15–37)
BASOPHILS # BLD AUTO: 0.03 K/UL — SIGNIFICANT CHANGE UP (ref 0–0.2)
BASOPHILS NFR BLD AUTO: 0.4 % — SIGNIFICANT CHANGE UP (ref 0–2)
BILIRUB SERPL-MCNC: 0.6 MG/DL — SIGNIFICANT CHANGE UP (ref 0.2–1.2)
BILIRUB UR-MCNC: NEGATIVE — SIGNIFICANT CHANGE UP
BUN SERPL-MCNC: 16 MG/DL — SIGNIFICANT CHANGE UP (ref 7–23)
CALCIUM SERPL-MCNC: 9.1 MG/DL — SIGNIFICANT CHANGE UP (ref 8.5–10.1)
CHLORIDE SERPL-SCNC: 108 MMOL/L — SIGNIFICANT CHANGE UP (ref 96–108)
CO2 SERPL-SCNC: 29 MMOL/L — SIGNIFICANT CHANGE UP (ref 22–31)
COLOR SPEC: YELLOW — SIGNIFICANT CHANGE UP
CREAT SERPL-MCNC: 0.69 MG/DL — SIGNIFICANT CHANGE UP (ref 0.5–1.3)
DIFF PNL FLD: NEGATIVE — SIGNIFICANT CHANGE UP
EGFR: 94 ML/MIN/1.73M2 — SIGNIFICANT CHANGE UP
EOSINOPHIL # BLD AUTO: 0.06 K/UL — SIGNIFICANT CHANGE UP (ref 0–0.5)
EOSINOPHIL NFR BLD AUTO: 0.8 % — SIGNIFICANT CHANGE UP (ref 0–6)
GLUCOSE SERPL-MCNC: 96 MG/DL — SIGNIFICANT CHANGE UP (ref 70–99)
GLUCOSE UR QL: NEGATIVE — SIGNIFICANT CHANGE UP
HCT VFR BLD CALC: 26.3 % — LOW (ref 34.5–45)
HGB BLD-MCNC: 8.9 G/DL — LOW (ref 11.5–15.5)
HYPOCHROMIA BLD QL: SLIGHT — SIGNIFICANT CHANGE UP
IMM GRANULOCYTES NFR BLD AUTO: 0.9 % — SIGNIFICANT CHANGE UP (ref 0–0.9)
INR BLD: 1.18 RATIO — HIGH (ref 0.88–1.16)
KETONES UR-MCNC: NEGATIVE — SIGNIFICANT CHANGE UP
LEUKOCYTE ESTERASE UR-ACNC: NEGATIVE — SIGNIFICANT CHANGE UP
LYMPHOCYTES # BLD AUTO: 4.55 K/UL — HIGH (ref 1–3.3)
LYMPHOCYTES # BLD AUTO: 57.1 % — HIGH (ref 13–44)
MAGNESIUM SERPL-MCNC: 2.3 MG/DL — SIGNIFICANT CHANGE UP (ref 1.6–2.6)
MANUAL SMEAR VERIFICATION: SIGNIFICANT CHANGE UP
MCHC RBC-ENTMCNC: 33.5 PG — SIGNIFICANT CHANGE UP (ref 27–34)
MCHC RBC-ENTMCNC: 33.8 GM/DL — SIGNIFICANT CHANGE UP (ref 32–36)
MCV RBC AUTO: 98.9 FL — SIGNIFICANT CHANGE UP (ref 80–100)
MONOCYTES # BLD AUTO: 0.36 K/UL — SIGNIFICANT CHANGE UP (ref 0–0.9)
MONOCYTES NFR BLD AUTO: 4.5 % — SIGNIFICANT CHANGE UP (ref 2–14)
NEUTROPHILS # BLD AUTO: 2.9 K/UL — SIGNIFICANT CHANGE UP (ref 1.8–7.4)
NEUTROPHILS NFR BLD AUTO: 36.3 % — LOW (ref 43–77)
NITRITE UR-MCNC: NEGATIVE — SIGNIFICANT CHANGE UP
PH UR: 6.5 — SIGNIFICANT CHANGE UP (ref 5–8)
PHOSPHATE SERPL-MCNC: 3.3 MG/DL — SIGNIFICANT CHANGE UP (ref 2.5–4.5)
PLAT MORPH BLD: NORMAL — SIGNIFICANT CHANGE UP
PLATELET # BLD AUTO: 40 K/UL — LOW (ref 150–400)
POLYCHROMASIA BLD QL SMEAR: SIGNIFICANT CHANGE UP
POTASSIUM SERPL-MCNC: 4.1 MMOL/L — SIGNIFICANT CHANGE UP (ref 3.5–5.3)
POTASSIUM SERPL-SCNC: 4.1 MMOL/L — SIGNIFICANT CHANGE UP (ref 3.5–5.3)
PROT SERPL-MCNC: 7.3 GM/DL — SIGNIFICANT CHANGE UP (ref 6–8.3)
PROT UR-MCNC: NEGATIVE — SIGNIFICANT CHANGE UP
PROTHROM AB SERPL-ACNC: 13.7 SEC — HIGH (ref 10.5–13.4)
RBC # BLD: 2.66 M/UL — LOW (ref 3.8–5.2)
RBC # FLD: 19.6 % — HIGH (ref 10.3–14.5)
RBC BLD AUTO: ABNORMAL
SODIUM SERPL-SCNC: 139 MMOL/L — SIGNIFICANT CHANGE UP (ref 135–145)
SP GR SPEC: 1 — LOW (ref 1.01–1.02)
TROPONIN I, HIGH SENSITIVITY RESULT: 9.8 NG/L — SIGNIFICANT CHANGE UP
UROBILINOGEN FLD QL: NEGATIVE — SIGNIFICANT CHANGE UP
WBC # BLD: 7.97 K/UL — SIGNIFICANT CHANGE UP (ref 3.8–10.5)
WBC # FLD AUTO: 7.97 K/UL — SIGNIFICANT CHANGE UP (ref 3.8–10.5)

## 2023-04-22 PROCEDURE — 85730 THROMBOPLASTIN TIME PARTIAL: CPT

## 2023-04-22 PROCEDURE — 85025 COMPLETE CBC W/AUTO DIFF WBC: CPT

## 2023-04-22 PROCEDURE — 85610 PROTHROMBIN TIME: CPT

## 2023-04-22 PROCEDURE — 99285 EMERGENCY DEPT VISIT HI MDM: CPT | Mod: FS

## 2023-04-22 PROCEDURE — 83735 ASSAY OF MAGNESIUM: CPT

## 2023-04-22 PROCEDURE — 84100 ASSAY OF PHOSPHORUS: CPT

## 2023-04-22 PROCEDURE — 81003 URINALYSIS AUTO W/O SCOPE: CPT

## 2023-04-22 PROCEDURE — 36415 COLL VENOUS BLD VENIPUNCTURE: CPT

## 2023-04-22 PROCEDURE — 93005 ELECTROCARDIOGRAM TRACING: CPT

## 2023-04-22 PROCEDURE — 84484 ASSAY OF TROPONIN QUANT: CPT

## 2023-04-22 PROCEDURE — 93010 ELECTROCARDIOGRAM REPORT: CPT

## 2023-04-22 PROCEDURE — 80053 COMPREHEN METABOLIC PANEL: CPT

## 2023-04-22 PROCEDURE — 99283 EMERGENCY DEPT VISIT LOW MDM: CPT | Mod: 25

## 2023-04-22 PROCEDURE — 87086 URINE CULTURE/COLONY COUNT: CPT

## 2023-04-22 RX ORDER — SODIUM CHLORIDE 9 MG/ML
1000 INJECTION INTRAMUSCULAR; INTRAVENOUS; SUBCUTANEOUS ONCE
Refills: 0 | Status: COMPLETED | OUTPATIENT
Start: 2023-04-22 | End: 2023-04-22

## 2023-04-22 RX ADMIN — SODIUM CHLORIDE 1000 MILLILITER(S): 9 INJECTION INTRAMUSCULAR; INTRAVENOUS; SUBCUTANEOUS at 19:23

## 2023-04-22 NOTE — ED ADULT TRIAGE NOTE - CHIEF COMPLAINT QUOTE
Pt presents to ED, sent by MD Jones for weakness, dizziness, lightheadedness, chills, fatigue x couple of days, worse today. Pt states "I am anemic but they said I didn't need a blood transfusion at the time, now I may need one." Pmhx of bone CA w/ mets

## 2023-04-22 NOTE — ED STATDOCS - PATIENT PORTAL LINK FT
You can access the FollowMyHealth Patient Portal offered by [facility] by registering at the following website: [email or web address]. By joining [facility]’s FollowMyHealth portal, you will also be able to view your health information using other applications (apps) compatible with our system.

## 2023-04-22 NOTE — ED STATDOCS - NSFOLLOWUPINSTRUCTIONS_ED_ALL_ED_FT
Lightheadedness    WHAT YOU NEED TO KNOW:    Lightheadedness is the feeling that you may faint, but you do not. Your heartbeat may be fast or feel like it flutters. Lightheadedness may occur when you take certain medicines, such as medicine to lower your blood pressure. Dehydration, low sodium, low blood sugar, an abnormal heart rhythm, and anxiety are other common causes.    DISCHARGE INSTRUCTIONS:    Return to the emergency department if:    You have sudden chest pain.    You have trouble breathing or shortness of breath.    You have vision changes, are sweating, and have nausea while you are sitting or lying down.    You feel flushed and your heart is fluttering.    You faint.  Contact your healthcare provider if:    You feel lightheaded often.    Your heart beats faster or slower than usual.    You have questions or concerns about your condition or care.  Follow up with your healthcare provider as directed: You may need more tests to help find the cause of your lightheadedness. The tests will help healthcare providers plan the best treatment for you. Write down your questions so you remember to ask them during your visits.    Self-care: Talk with your healthcare provider about these and other ways to manage your symptoms:    Lie down when you feel lightheaded, your throat gets tight, or your vision changes. Raise your legs above the level of your heart.    Stand up slowly. Sit on the side of the bed or couch for a few minutes before you stand up.    Take slow, deep breaths when you feel lightheaded. This can help decrease the feeling that you might faint.    Ask if you need to avoid hot baths and saunas. These may make your symptoms worse.  Watch for signs of low blood sugar: These include hunger, nervousness, sweating, and fast or fluttery heartbeats. Talk with your healthcare provider about ways to keep your blood sugar level steady.    Check your blood pressure often: You should do this especially if you take medicine to lower your blood pressure. Check your blood pressure when you are lying down and when you are standing. Ask how often to check during the day. Keep a record of your blood pressure numbers. Your healthcare provider may use the record to help plan your treatment.    Keep a record of your lightheadedness episodes: Include your symptoms and your activity before and after the episode. The record can help your healthcare provider find the cause of your lightheadedness and help you manage episodes.

## 2023-04-22 NOTE — ED ADULT NURSE NOTE - OBJECTIVE STATEMENT
PT presents to ED c/o fatigue w/ stage 4 bone cancer. Pt has chronic anemia, was sent to ED by  for checkup to see if transfusion is necessary. PT only complaint is fatigue. PT skin color appropriate for race, respirations even and unlabored. ED workup in progress, will continue to monitor. Pasquale Duval RN

## 2023-04-22 NOTE — ED STATDOCS - OBJECTIVE STATEMENT
67 year old female with PMHx of lung CA and bone CA presents to the ED sent in by Dr. Scott oncology for further evaluation of general weakness. According to pt, she has been feeling generalized weakness, lightheaded, both worse today. Denies headache, vision changes, chest pain, SOB, fever, vomiting, diarrhea, abd pain, urinary complaints or any other symptoms. No other concerns. No other injuries or complaints.

## 2023-04-22 NOTE — ED STATDOCS - PRINCIPAL DIAGNOSIS
Alert and oriented to person, place, time/situation. normal mood and affect. no apparent risk to self or others. Lightheaded Yes

## 2023-04-22 NOTE — ED STATDOCS - PROGRESS NOTE DETAILS
66 yo female with a PMH of lung cancer with mets to the bone was sen tin by oncologist Dr. Jones for feeling tired and lightheaded. Pt states when she was first diagnosed with cancer she was having these symptoms but they worsened today. Pt forces herself to eat and drink. Pt with recent blood work drawn on 4/19 with wbc of 6, hemoglobin of 9.7, and platelets of 35.   Will check labs, ua, meds, and reeval. -Drew Ibarra PA-C Spoke with Dr. Jones. Discussed the unremarkable. No significant drop int eh hemoglobin and platelets increased. States if pt feels better, she can f/u in the office on monday. Pt aware. Pending UA. -Drew Ibarra PA-C Pt feels better. Requesting to go home. Advised to f/u with onc in the office on monday. -Drew Ibarra PA-C

## 2023-04-24 NOTE — ASU PATIENT PROFILE, ADULT - WILL THE PATIENT ACCEPT THE PFIZER COVID-19 VACCINE IF ELIGIBLE AND IT IS AVAILABLE?
Was the patient seen in the last year in this department? Yes    Does patient have an active prescription for medications requested? No     Received Request Via: Pharmacy      Pt met protocol?: Yes    OV 6/18      No

## 2023-04-24 NOTE — ASU PATIENT PROFILE, ADULT - NSICDXPASTMEDICALHX_GEN_ALL_CORE_FT
PAST MEDICAL HISTORY:  Lung cancer      PAST MEDICAL HISTORY:  Breast cancer, female     Lung cancer

## 2023-04-25 ENCOUNTER — OUTPATIENT (OUTPATIENT)
Dept: INPATIENT UNIT | Facility: HOSPITAL | Age: 68
LOS: 1 days | Discharge: HOME | End: 2023-04-25
Payer: MEDICARE

## 2023-04-25 ENCOUNTER — RESULT REVIEW (OUTPATIENT)
Age: 68
End: 2023-04-25

## 2023-04-25 ENCOUNTER — TRANSCRIPTION ENCOUNTER (OUTPATIENT)
Age: 68
End: 2023-04-25

## 2023-04-25 VITALS
RESPIRATION RATE: 16 BRPM | HEART RATE: 74 BPM | OXYGEN SATURATION: 99 % | DIASTOLIC BLOOD PRESSURE: 88 MMHG | SYSTOLIC BLOOD PRESSURE: 142 MMHG | WEIGHT: 157.63 LBS | HEIGHT: 63 IN | TEMPERATURE: 98 F

## 2023-04-25 VITALS
HEART RATE: 72 BPM | SYSTOLIC BLOOD PRESSURE: 128 MMHG | OXYGEN SATURATION: 98 % | RESPIRATION RATE: 16 BRPM | DIASTOLIC BLOOD PRESSURE: 70 MMHG | TEMPERATURE: 97 F

## 2023-04-25 DIAGNOSIS — D61.818 OTHER PANCYTOPENIA: ICD-10-CM

## 2023-04-25 DIAGNOSIS — C50.919 MALIGNANT NEOPLASM OF UNSPECIFIED SITE OF UNSPECIFIED FEMALE BREAST: ICD-10-CM

## 2023-04-25 LAB
CULTURE RESULTS: SIGNIFICANT CHANGE UP
SPECIMEN SOURCE: SIGNIFICANT CHANGE UP

## 2023-04-25 PROCEDURE — 88313 SPECIAL STAINS GROUP 2: CPT | Mod: 26

## 2023-04-25 PROCEDURE — 88305 TISSUE EXAM BY PATHOLOGIST: CPT

## 2023-04-25 PROCEDURE — 88305 TISSUE EXAM BY PATHOLOGIST: CPT | Mod: 26

## 2023-04-25 PROCEDURE — 88280 CHROMOSOME KARYOTYPE STUDY: CPT

## 2023-04-25 PROCEDURE — 88342 IMHCHEM/IMCYTCHM 1ST ANTB: CPT | Mod: 26,59

## 2023-04-25 PROCEDURE — 88237 TISSUE CULTURE BONE MARROW: CPT | Mod: 59

## 2023-04-25 PROCEDURE — 88313 SPECIAL STAINS GROUP 2: CPT

## 2023-04-25 PROCEDURE — 38222 DX BONE MARROW BX & ASPIR: CPT | Mod: RT

## 2023-04-25 PROCEDURE — 88189 FLOWCYTOMETRY/READ 16 & >: CPT

## 2023-04-25 PROCEDURE — 88360 TUMOR IMMUNOHISTOCHEM/MANUAL: CPT | Mod: 26

## 2023-04-25 PROCEDURE — 88341 IMHCHEM/IMCYTCHM EA ADD ANTB: CPT | Mod: 26,59

## 2023-04-25 PROCEDURE — 88185 FLOWCYTOMETRY/TC ADD-ON: CPT

## 2023-04-25 PROCEDURE — 88342 IMHCHEM/IMCYTCHM 1ST ANTB: CPT | Mod: XU

## 2023-04-25 PROCEDURE — 88184 FLOWCYTOMETRY/ TC 1 MARKER: CPT

## 2023-04-25 PROCEDURE — 77012 CT SCAN FOR NEEDLE BIOPSY: CPT | Mod: 26

## 2023-04-25 PROCEDURE — 88341 IMHCHEM/IMCYTCHM EA ADD ANTB: CPT

## 2023-04-25 PROCEDURE — 85097 BONE MARROW INTERPRETATION: CPT

## 2023-04-25 PROCEDURE — 88264 CHROMOSOME ANALYSIS 20-25: CPT

## 2023-04-25 PROCEDURE — 85540 WBC ALKALINE PHOSPHATASE: CPT

## 2023-04-25 PROCEDURE — 77012 CT SCAN FOR NEEDLE BIOPSY: CPT

## 2023-04-25 PROCEDURE — 88360 TUMOR IMMUNOHISTOCHEM/MANUAL: CPT

## 2023-04-25 PROCEDURE — 87205 SMEAR GRAM STAIN: CPT

## 2023-04-25 RX ORDER — MULTIVIT-MIN/FERROUS GLUCONATE 9 MG/15 ML
1 LIQUID (ML) ORAL
Qty: 0 | Refills: 0 | DISCHARGE

## 2023-04-25 RX ORDER — SODIUM CHLORIDE 9 MG/ML
1000 INJECTION INTRAMUSCULAR; INTRAVENOUS; SUBCUTANEOUS
Refills: 0 | Status: DISCONTINUED | OUTPATIENT
Start: 2023-04-25 | End: 2023-04-25

## 2023-04-25 RX ORDER — ONDANSETRON 8 MG/1
4 TABLET, FILM COATED ORAL EVERY 6 HOURS
Refills: 0 | Status: DISCONTINUED | OUTPATIENT
Start: 2023-04-25 | End: 2023-04-25

## 2023-04-25 RX ORDER — ASCORBIC ACID 60 MG
1 TABLET,CHEWABLE ORAL
Qty: 0 | Refills: 0 | DISCHARGE

## 2023-04-25 RX ORDER — OXYCODONE HYDROCHLORIDE 5 MG/1
5 TABLET ORAL ONCE
Refills: 0 | Status: DISCONTINUED | OUTPATIENT
Start: 2023-04-25 | End: 2023-04-25

## 2023-04-25 RX ORDER — ACETAMINOPHEN 500 MG
1000 TABLET ORAL ONCE
Refills: 0 | Status: DISCONTINUED | OUTPATIENT
Start: 2023-04-25 | End: 2023-04-25

## 2023-04-25 RX ORDER — FENTANYL CITRATE 50 UG/ML
25 INJECTION INTRAVENOUS
Refills: 0 | Status: DISCONTINUED | OUTPATIENT
Start: 2023-04-25 | End: 2023-04-25

## 2023-04-25 RX ORDER — ACETAMINOPHEN 500 MG
2 TABLET ORAL
Qty: 0 | Refills: 0 | DISCHARGE

## 2023-04-25 NOTE — ASU DISCHARGE PLAN (ADULT/PEDIATRIC) - NS MD DC FALL RISK RISK
For information on Fall & Injury Prevention, visit: [email or web address] OR  [email or web address] OR  https://www.cdc.gov/steadi/patient.html

## 2023-04-26 LAB — TM INTERPRETATION: SIGNIFICANT CHANGE UP

## 2023-05-01 LAB — HEMATOPATHOLOGY REPORT: SIGNIFICANT CHANGE UP

## 2023-05-11 LAB — CHROM ANALY OVERALL INTERP SPEC-IMP: SIGNIFICANT CHANGE UP

## 2023-05-22 LAB — HEMATOPATHOLOGY REPORT: SIGNIFICANT CHANGE UP

## 2023-06-20 NOTE — ED PROVIDER NOTE - CPE EDP EYES NORM
PROGRESS NOTE - [facility] INFECTIOUS DISEASE    SUBJECTIVE:  Patient seen and examined this morning. Daughter at bedside. Pt with episode of hypotension yesterday afternoon. Given LR bolus. Lactic acid elevated to 3.3. no fevers. Rpt blood cx obtained   pt currently resting in bed, reports feeling good. Stable on room air. WBC 9k today. No abd pain, n/v/d. L foot pain improved.        ALLERGIES  ALLERGIES:  No Known Allergies          PHYSICAL EXAM  Visit Vitals  BP 98/55 (BP Location: RUE - Right upper extremity, Patient Position: Semi-Heard's)   Pulse 62   Temp 98.3 °F (36.8 °C) (Oral)   Resp 16   Ht 5' 4\" (1.626 m)   Wt 57.4 kg (126 lb 8.7 oz)   SpO2 98%   BMI 21.72 kg/m²      Physical Exam   General: Awake, Oriented. NAD. In good spirits. Appears comfortable. Not acutely ill appearing  Eye:   EOMI. Normal conjunctiva.  no scleral icterus  HENT:  Normocephalic. Atraumatic. Dry mm.  Neck:  Supple. Non-tender.    Respiratory:  Improved aeration, clear. Non labored. No respiratory distress. On room air.   Cardiovascular:  Normal rate. Regular rhythm. No murmur  Gastrointestinal:  Soft. No ttp No distention. Normal bowel sounds.    Genitourinary:  No CVA tenderness. +copeland with yellow urine  Integumentary:  Warm. No rash. LUE midline c/d/i  Left calcaneus with necrotic hard eschar, minimally tender to palpation, no surrounding erythema or warmth. No fluctuance.  No drainage.  L groin incision well approximated, staples in place, no surrounding SOI.   L chest incision well approximated, staples in place. No acute SOI.  Musculoskeletal: Normal ROM. No deformity.  Neurologic: Awake. Alert and oriented. No focal deficits. CN II-XII intact  Psychiatric:  cooperative, appropriate mood      LABORATORY  Recent Results (from the past 24 hour(s))   Lactic Acid, Venous    Collection Time: 06/19/23  3:59 PM   Result Value Ref Range    Lactate, Venous 2.3 (HH) 0.0 - 2.0 mmol/L   Cortisol    Collection Time: 06/19/23  3:59 PM    Result Value Ref Range    Cortisol 12.1 3.4 - 22.5 mcg/dL   Blood Culture    Collection Time: 06/19/23  6:12 PM    Specimen: Blood, Peripheral   Result Value Ref Range    Culture, Blood or Bone Marrow No Growth <24 hours    Lactic Acid, Venous    Collection Time: 06/19/23  6:12 PM   Result Value Ref Range    Lactate, Venous 3.3 (HH) 0.0 - 2.0 mmol/L   Blood Culture    Collection Time: 06/19/23  6:22 PM    Specimen: Blood, Peripheral   Result Value Ref Range    Culture, Blood or Bone Marrow No Growth <24 hours    CBC No Differential    Collection Time: 06/19/23  8:23 PM   Result Value Ref Range    WBC 9.2 4.2 - 11.0 K/mcL    RBC 2.95 (L) 4.50 - 5.90 mil/mcL    HGB 8.1 (L) 13.0 - 17.0 g/dL    HCT 25.9 (L) 39.0 - 51.0 %    MCV 87.8 78.0 - 100.0 fl    MCH 27.5 26.0 - 34.0 pg    MCHC 31.3 (L) 32.0 - 36.5 g/dL     140 - 450 K/mcL    RDW-CV 16.9 (H) 11.0 - 15.0 %    RDW-SD 54.1 (H) 39.0 - 50.0 fL    NRBC 0 <=0 /100 WBC   Partial Thromboplastin Time    Collection Time: 06/20/23  5:27 AM   Result Value Ref Range    PTT 40 (H) 22 - 30 sec   Prothrombin Time    Collection Time: 06/20/23  5:27 AM   Result Value Ref Range    Prothrombin Time 40.3 (H) 9.7 - 11.8 sec    INR 4.2     Comprehensive Metabolic Panel    Collection Time: 06/20/23  5:27 AM   Result Value Ref Range    Fasting Status      Sodium 141 135 - 145 mmol/L    Potassium 3.8 3.4 - 5.1 mmol/L    Chloride 111 (H) 97 - 110 mmol/L    Carbon Dioxide 24 21 - 32 mmol/L    Anion Gap 10 7 - 19 mmol/L    Glucose 133 (H) 70 - 99 mg/dL    BUN 16 6 - 20 mg/dL    Creatinine 0.69 0.67 - 1.17 mg/dL    Glomerular Filtration Rate 90 >=60    BUN/Cr 23 7 - 25    Calcium 8.0 (L) 8.4 - 10.2 mg/dL    Bilirubin, Total 0.3 0.2 - 1.0 mg/dL    GOT/AST 32 <=37 Units/L    GPT/ALT 48 <64 Units/L    Alkaline Phosphatase 68 45 - 117 Units/L    Albumin 2.3 (L) 3.6 - 5.1 g/dL    Protein, Total 5.2 (L) 6.4 - 8.2 g/dL    Globulin 2.9 2.0 - 4.0 g/dL    A/G Ratio 0.8 (L) 1.0 - 2.4   Magnesium     Collection Time: 06/20/23  5:27 AM   Result Value Ref Range    Magnesium 1.9 1.7 - 2.4 mg/dL   Phosphorus    Collection Time: 06/20/23  5:27 AM   Result Value Ref Range    Phosphorus 2.1 (L) 2.4 - 4.7 mg/dL   CBC with Automated Differential (performable only)    Collection Time: 06/20/23  5:27 AM   Result Value Ref Range    WBC 8.8 4.2 - 11.0 K/mcL    RBC 2.83 (L) 4.50 - 5.90 mil/mcL    HGB 7.7 (L) 13.0 - 17.0 g/dL    HCT 25.0 (L) 39.0 - 51.0 %    MCV 88.3 78.0 - 100.0 fl    MCH 27.2 26.0 - 34.0 pg    MCHC 30.8 (L) 32.0 - 36.5 g/dL    RDW-CV 17.0 (H) 11.0 - 15.0 %    RDW-SD 54.4 (H) 39.0 - 50.0 fL     140 - 450 K/mcL    NRBC 1 (H) <=0 /100 WBC    Neutrophil, Percent 76 %    Lymphocytes, Percent 13 %    Mono, Percent 8 %    Eosinophils, Percent 2 %    Basophils, Percent 0 %    Immature Granulocytes 1 %    Absolute Neutrophils 6.7 1.8 - 7.7 K/mcL    Absolute Lymphocytes 1.2 1.0 - 4.0 K/mcL    Absolute Monocytes 0.7 0.3 - 0.9 K/mcL    Absolute Eosinophils  0.2 0.0 - 0.5 K/mcL    Absolute Basophils 0.0 0.0 - 0.3 K/mcL    Absolute Immature Granulocytes 0.1 0.0 - 0.2 K/mcL         IMAGING: reviewed  XR CHEST AP OR PA   Final Result      CT CHEST ABDOMEN PELVIS W CONTRAST   Final Result       Cardiomegaly.         Mid ascending aortic aneurysm measuring 4.5 cm.       Moderate bilateral pleural effusions with dependent atelectasis.         Linear region of atelectasis in the left upper lobe.        Significant bladder distention.  Multiple bladder diverticula suggestive of   bladder distention/obstruction.       Decreased osseous mineralization with compression fractures of superior   endplates of T7 and L1 vertebral bodies.         Patent left axillofemoral bypass graft.  No definitive evidence of flow in   the femoral-femoral bypass graft.      Electronically Signed by: JOHN JASON MD    Signed on: 6/14/2023 7:23 PM    Workstation ID: [id number]      MRI FOOT LEFT WO CONTRAST   Final Result   1.    Posterolateral heel soft tissue ulceration adjacent to the calcaneal   tuberosity.  Subcortical edema signal in the posterolateral calcaneal   tuberosity does not meet criteria for osteomyelitis, favored to represent   reactive osteitis.  However, cannot exclude early osteomyelitis.  No   abscess.         Electronically Signed by: INNA LOZOYA MD    Signed on: 6/14/2023 3:19 PM    Workstation ID: [id number]      FL VIDEO SWALLOW   Final Result   1.   Please see detailed speech pathology department report for   recommendations      Electronically Signed by: TAMIR COBIAN MD    Signed on: 6/12/2023 4:36 PM    Workstation ID: [id number]      XR CHEST AP OR PA   Final Result   Perihilar interstitial opacities.  Bibasilar atelectasis.  Small bilateral   pleural effusions.  No pneumothorax.        Electronically Signed by: DARRELL TIERNEY MD    Signed on: 6/11/2023 9:55 AM    Workstation ID: [id number]      XR CHEST AP OR PA   Final Result   FINDINGS/IMPRESSION:        The lungs are hyperinflated.  Small bilateral pleural effusions and   basilar opacities.  Cardiac mediastinal silhouette is stable.    Postoperative changes in the chest.  No gross pneumothorax.  Osteopenia and   degenerative changes in the thoracic spine.      Electronically Signed by: SYLVIA REY M.D.    Signed on: 6/10/2023 3:00 PM    Workstation ID: [id number]      XR FOOT 3 OR MORE VIEWS LEFT   Final Result   1.   There is no acute fracture or dislocation or concerning geographic   bony abnormality left foot    2.   Specifically there is no radiographic abnormality to suggest   osteomyelitis in the region of the calcaneus   3.   There is no identifiable deep ulceration in the region of the   calcaneus or gas in the soft tissues   4.   Please see additional comments/observations as outlined in the body of   the report      Electronically Signed by: TAMIR COBIAN MD    Signed on: 6/9/2023 1:09 PM    Workstation ID: [id number]      XR CHEST AP  normal... OR PA   Final Result   Impression:    Cardiomediastinal silhouette is normal. No lobar consolidation pleural   effusion or pneumothorax. Subtle density in the left lower lobe new since   prior study may represent early infiltrate correlate with clinical exam.   Operative changes of left chest. Upper abdomen is unremarkable.   Calcification density in the lungs most likely granulomatous in origin.      Remainder of the exam is otherwise stable.         Electronically Signed by: TARIQ RANKIN MD    Signed on: 6/8/2023 10:42 PM    Workstation ID: [id number]      Cath/PV Case   Final Result            MICRO  6/5 MRSA nares pPCR: detected  6/7 Urine cx: >100k Pseudomonas, pan susc  6/8 Blood cx: 1/1 (+) E.faecalis, amp susc  6/9 Blood cx: 2/2 neg  6/10 L heel wound cx: +pseudomonas (pan susc) and MRSA    ASSESSMENT/PLAN    Antibiotics:  Ampicillin 6/15-    S/p  Cefazolin 6/5  Zosyn 6/8-6/15  zyvox 6/12-6/15      Impression:    # Enterococcus faecalis bacteremia   - rapid ID with Vanc susc E.faecalis   - 2d echo neg   - 6/13 ACACIA neg   - f/u ct c/a/p--> moderate BL effusions, bladder distention with multiple bladder diverticula    # Lactic acidosis ; resolved    # LLL infiltrate    # Chronic L heel eschar   - xr at OSH with no underlying bony changes   - noted to have drainage 6/10, cx + psar and MRSA   - MRI neg for OM, reactive osteitis    # Leukocytosis ; resolved   - sp steroids 6/11-6/16  # acute urinary retention sp copeland 6/18  # Thrombocytopenia; improved  # Elevated APR's  # COPD exacerbation  # Abnormal UA, pyruria   - asymptomatic   - Uc x+ pseudomonas  # Critical limb ischemia, s/p L ax-fem bypass 6/5  # PAD   - hx prior fem-fem bypass which had occcluded  # hx Mechanical AVR  # sp PPM      Plan:    - continue iv ampicillin to complete 14d course for E faecalis bacteremia. EOT 6/23.   - midline in place  - pt will then need to have weekly blood cx x 2 for surveillance cultures, orders previously placed.  (obtain on 6/30 and 7/7)  - pt has completed course for possible UTI and SSTI with prior zosyn/zyvox  - follow temps, labs.   - monitor clinical status  -  Sp ACACIA, no e/o valvular, prosthetic, or pacemaker associated vegetations.  - Continue regular wound care and dressing changes  - monitor L heel closely. L heel completely dry today, no drainage noted on exam.  - prior notes, imaging and labs reviewed  - dw patient, rn, daughter at bedside  - discussed case/plan of care with Dr Sadler  - [facility] rec completed for discharge planning for home IV abx. Pt to f/up with ID in the clinic in 3 weeks, call for an appt    Marva Pimentel PA-C  6/20/2023

## 2023-07-21 ENCOUNTER — OFFICE (OUTPATIENT)
Dept: URBAN - METROPOLITAN AREA CLINIC 102 | Facility: CLINIC | Age: 68
Setting detail: OPHTHALMOLOGY
End: 2023-07-21
Payer: MEDICARE

## 2023-07-21 DIAGNOSIS — H43.393: ICD-10-CM

## 2023-07-21 DIAGNOSIS — H25.13: ICD-10-CM

## 2023-07-21 DIAGNOSIS — H35.362: ICD-10-CM

## 2023-07-21 DIAGNOSIS — H35.3121: ICD-10-CM

## 2023-07-21 PROCEDURE — 99204 OFFICE O/P NEW MOD 45 MIN: CPT | Performed by: OPHTHALMOLOGY

## 2023-07-21 PROCEDURE — 92020 GONIOSCOPY: CPT | Performed by: OPHTHALMOLOGY

## 2023-07-21 PROCEDURE — 92134 CPTRZ OPH DX IMG PST SGM RTA: CPT | Performed by: OPHTHALMOLOGY

## 2023-07-21 ASSESSMENT — REFRACTION_MANIFEST
OS_ADD: +2.50
OS_SPHERE: +0.50
OS_AXIS: 80
OD_ADD: +2.50
OD_SPHERE: +1.50
OD_VA1: 20/30
OS_AXIS: 090
OD_CYLINDER: -1.00
OS_SPHERE: +1.75
OD_AXIS: 090
OS_CYLINDER: -0.50
OD_SPHERE: +2.50
OD_CYLINDER: -1.00
OS_VA1: 20/30
OS_CYLINDER: -0.75
OD_AXIS: 85

## 2023-07-21 ASSESSMENT — AXIALLENGTH_DERIVED
OD_AL: 22.6028
OS_AL: 22.3865
OS_AL: 22.7882
OS_AL: 22.3865
OD_AL: 22.2507
OD_AL: 22.1644

## 2023-07-21 ASSESSMENT — KERATOMETRY
OS_AXISANGLE_DEGREES: 158
OD_K2POWER_DIOPTERS: 46.00
OS_K2POWER_DIOPTERS: 46.00
OD_AXISANGLE_DEGREES: 011
METHOD_AUTO_MANUAL: AUTO
OS_K1POWER_DIOPTERS: 45.00
OD_K1POWER_DIOPTERS: 44.50

## 2023-07-21 ASSESSMENT — CONFRONTATIONAL VISUAL FIELD TEST (CVF)
OS_FINDINGS: FULL
OD_FINDINGS: FULL

## 2023-07-21 ASSESSMENT — REFRACTION_CURRENTRX
OS_AXIS: 180
OS_VPRISM_DIRECTION: PROGS
OD_SPHERE: +1.75
OD_OVR_VA: 20/
OD_AXIS: 084
OS_SPHERE: +0.75
OD_CYLINDER: -1.25
OS_CYLINDER: 0.00
OD_ADD: +2.25
OS_OVR_VA: 20/
OS_ADD: +2.25
OD_VPRISM_DIRECTION: PROGS

## 2023-07-21 ASSESSMENT — TONOMETRY
OD_IOP_MMHG: 17
OS_IOP_MMHG: 19
OS_IOP_MMHG: 18
OD_IOP_MMHG: 21

## 2023-07-21 ASSESSMENT — SPHEQUIV_DERIVED
OD_SPHEQUIV: 2
OD_SPHEQUIV: 1
OS_SPHEQUIV: 0.25
OD_SPHEQUIV: 2.25
OS_SPHEQUIV: 1.375
OS_SPHEQUIV: 1.375

## 2023-07-21 ASSESSMENT — REFRACTION_AUTOREFRACTION
OS_SPHERE: +1.75
OD_CYLINDER: -1.00
OS_CYLINDER: -0.75
OS_AXIS: 091
OD_SPHERE: +2.75
OD_AXIS: 087

## 2023-07-21 ASSESSMENT — VISUAL ACUITY
OS_BCVA: 20/30-
OD_BCVA: 20/30

## 2023-08-01 ENCOUNTER — OFFICE (OUTPATIENT)
Dept: URBAN - METROPOLITAN AREA CLINIC 102 | Facility: CLINIC | Age: 68
Setting detail: OPHTHALMOLOGY
End: 2023-08-01
Payer: MEDICARE

## 2023-08-01 DIAGNOSIS — H35.362: ICD-10-CM

## 2023-08-01 DIAGNOSIS — H25.11: ICD-10-CM

## 2023-08-01 DIAGNOSIS — H43.393: ICD-10-CM

## 2023-08-01 DIAGNOSIS — H35.3121: ICD-10-CM

## 2023-08-01 DIAGNOSIS — H25.13: ICD-10-CM

## 2023-08-01 PROCEDURE — 92136 OPHTHALMIC BIOMETRY: CPT | Performed by: OPHTHALMOLOGY

## 2023-08-01 PROCEDURE — 99213 OFFICE O/P EST LOW 20 MIN: CPT | Performed by: OPHTHALMOLOGY

## 2023-08-01 ASSESSMENT — REFRACTION_CURRENTRX
OD_VPRISM_DIRECTION: PROGS
OS_CYLINDER: 0.00
OS_SPHERE: +0.75
OD_CYLINDER: -1.25
OS_OVR_VA: 20/
OS_ADD: +2.25
OD_SPHERE: +1.75
OD_AXIS: 084
OS_AXIS: 180
OS_VPRISM_DIRECTION: PROGS
OD_ADD: +2.25
OD_OVR_VA: 20/

## 2023-08-01 ASSESSMENT — REFRACTION_AUTOREFRACTION
OD_CYLINDER: -1.50
OD_AXIS: 092
OS_SPHERE: +2.00
OS_AXIS: 098
OD_SPHERE: +3.25
OS_CYLINDER: -0.75

## 2023-08-01 ASSESSMENT — REFRACTION_MANIFEST
OS_SPHERE: +1.75
OD_CYLINDER: -1.00
OD_SPHERE: +1.50
OS_ADD: +2.50
OS_AXIS: 80
OS_CYLINDER: -0.75
OS_AXIS: 090
OD_SPHERE: +2.50
OD_CYLINDER: -1.00
OD_VA1: 20/30
OD_AXIS: 85
OS_CYLINDER: -0.50
OD_AXIS: 090
OS_SPHERE: +0.50
OS_VA1: 20/30
OD_ADD: +2.50

## 2023-08-01 ASSESSMENT — KERATOMETRY
OD_K1POWER_DIOPTERS: 44.25
OS_CYLPOWER_DEGREES: 1
OD_K1POWER_DIOPTERS: 44.75
OS_CYLAXISANGLE_DEGREES: 165
OD_AXISANGLE_DEGREES: 014
OS_K2POWER_DIOPTERS: 45.75
OS_K1K2_AVERAGE: 45.25
OS_K2POWER_DIOPTERS: 45.75
OS_K1POWER_DIOPTERS: 44.75
OS_AXISANGLE2_DEGREES: 165
OS_AXISANGLE_DEGREES: 165
OD_K1K2_AVERAGE: 45.125
OS_AXISANGLE_DEGREES: 165
OD_CYLAXISANGLE_DEGREES: 13
OD_CYLPOWER_DEGREES: 0.75
OD_AXISANGLE_DEGREES: 013
OD_K2POWER_DIOPTERS: 45.50
OS_K1POWER_DIOPTERS: 44.75
METHOD_AUTO_MANUAL: AUTO
OD_K2POWER_DIOPTERS: 46.00
OD_AXISANGLE2_DEGREES: 013

## 2023-08-01 ASSESSMENT — AXIALLENGTH_DERIVED
OD_AL: 22.2917
OS_AL: 22.8742
OD_AL: 22.645
OD_AL: 22.1191
OS_AL: 22.3815
OS_AL: 22.4695

## 2023-08-01 ASSESSMENT — SPHEQUIV_DERIVED
OD_SPHEQUIV: 2.5
OS_SPHEQUIV: 1.375
OS_SPHEQUIV: 1.625
OD_SPHEQUIV: 2
OD_SPHEQUIV: 1
OS_SPHEQUIV: 0.25

## 2023-08-01 ASSESSMENT — VISUAL ACUITY
OD_BCVA: 20/30
OS_BCVA: 20/30-

## 2023-08-01 ASSESSMENT — CONFRONTATIONAL VISUAL FIELD TEST (CVF)
OD_FINDINGS: FULL
OS_FINDINGS: FULL

## 2023-08-01 ASSESSMENT — TONOMETRY
OD_IOP_MMHG: 16
OS_IOP_MMHG: 19

## 2023-08-23 ENCOUNTER — AMBUL SURGICAL CARE (OUTPATIENT)
Dept: URBAN - METROPOLITAN AREA SURGERY 14 | Facility: SURGERY | Age: 68
Setting detail: OPHTHALMOLOGY
End: 2023-08-23
Payer: MEDICARE

## 2023-08-23 DIAGNOSIS — H52.211: ICD-10-CM

## 2023-08-23 DIAGNOSIS — H25.11: ICD-10-CM

## 2023-08-23 PROCEDURE — V2787 ASTIGMATISM-CORRECT FUNCTION: HCPCS | Performed by: OPHTHALMOLOGY

## 2023-08-23 PROCEDURE — 66984 XCAPSL CTRC RMVL W/O ECP: CPT | Performed by: OPHTHALMOLOGY

## 2023-08-24 ENCOUNTER — OFFICE (OUTPATIENT)
Dept: URBAN - METROPOLITAN AREA CLINIC 102 | Facility: CLINIC | Age: 68
Setting detail: OPHTHALMOLOGY
End: 2023-08-24
Payer: MEDICARE

## 2023-08-24 DIAGNOSIS — Z96.1: ICD-10-CM

## 2023-08-24 PROCEDURE — 99024 POSTOP FOLLOW-UP VISIT: CPT | Performed by: OPHTHALMOLOGY

## 2023-08-24 ASSESSMENT — CORNEAL EDEMA CLINICAL DESCRIPTION: OD_CORNEALEDEMA: T

## 2023-08-24 ASSESSMENT — SPHEQUIV_DERIVED
OS_SPHEQUIV: 1.625
OS_SPHEQUIV: 0.25
OD_SPHEQUIV: 1
OD_SPHEQUIV: 2
OD_SPHEQUIV: -0.375
OS_SPHEQUIV: 1.375

## 2023-08-24 ASSESSMENT — AXIALLENGTH_DERIVED
OD_AL: 22.6028
OS_AL: 22.3865
OS_AL: 22.7882
OD_AL: 22.2507
OS_AL: 22.2991
OD_AL: 23.1055

## 2023-08-24 ASSESSMENT — REFRACTION_MANIFEST
OS_CYLINDER: -0.75
OS_SPHERE: +0.50
OD_AXIS: 85
OD_CYLINDER: -1.00
OS_AXIS: 80
OD_AXIS: 090
OS_AXIS: 090
OS_SPHERE: +1.75
OS_ADD: +2.50
OD_CYLINDER: -1.00
OS_CYLINDER: -0.50
OD_SPHERE: +2.50
OD_ADD: +2.50
OS_VA1: 20/30
OD_VA1: 20/30
OD_SPHERE: +1.50

## 2023-08-24 ASSESSMENT — REFRACTION_AUTOREFRACTION
OS_CYLINDER: -0.75
OS_SPHERE: +2.00
OD_SPHERE: 0.00
OD_AXIS: 161
OS_AXIS: 096
OD_CYLINDER: -0.75

## 2023-08-24 ASSESSMENT — REFRACTION_CURRENTRX
OD_VPRISM_DIRECTION: PROGS
OS_ADD: +2.25
OD_ADD: +2.25
OD_CYLINDER: -1.25
OD_AXIS: 084
OS_SPHERE: +0.75
OS_VPRISM_DIRECTION: PROGS
OS_AXIS: 180
OS_CYLINDER: 0.00
OD_OVR_VA: 20/
OS_OVR_VA: 20/
OD_SPHERE: +1.75

## 2023-08-24 ASSESSMENT — KERATOMETRY
OD_K2POWER_DIOPTERS: 45.75
OD_AXISANGLE_DEGREES: 021
OS_AXISANGLE_DEGREES: 162
OS_K2POWER_DIOPTERS: 46.00
OS_K1POWER_DIOPTERS: 45.00
OD_K1POWER_DIOPTERS: 44.75
METHOD_AUTO_MANUAL: AUTO

## 2023-08-24 ASSESSMENT — CONFRONTATIONAL VISUAL FIELD TEST (CVF)
OS_FINDINGS: FULL
OD_FINDINGS: FULL

## 2023-08-24 ASSESSMENT — VISUAL ACUITY
OD_BCVA: 20/30-1
OS_BCVA: 20/25

## 2023-08-24 ASSESSMENT — TONOMETRY: OS_IOP_MMHG: 18

## 2023-08-29 ENCOUNTER — OFFICE (OUTPATIENT)
Dept: URBAN - METROPOLITAN AREA CLINIC 102 | Facility: CLINIC | Age: 68
Setting detail: OPHTHALMOLOGY
End: 2023-08-29
Payer: MEDICARE

## 2023-08-29 DIAGNOSIS — Z96.1: ICD-10-CM

## 2023-08-29 DIAGNOSIS — H53.9: ICD-10-CM

## 2023-08-29 PROCEDURE — 99024 POSTOP FOLLOW-UP VISIT: CPT | Performed by: OPHTHALMOLOGY

## 2023-08-29 ASSESSMENT — REFRACTION_MANIFEST
OD_SPHERE: PLANO
OD_SPHERE: +2.50
OS_AXIS: 090
OD_AXIS: 090
OS_CYLINDER: -0.75
OS_SPHERE: +1.75
OD_CYLINDER: -1.00
OD_VA1: 20/30
OS_VA1: 20/30
OD_VA1: 20/20

## 2023-08-29 ASSESSMENT — REFRACTION_AUTOREFRACTION
OS_SPHERE: +2.00
OD_SPHERE: 0.00
OS_AXIS: 092
OD_AXIS: 162
OS_CYLINDER: -1.00
OD_CYLINDER: -0.75

## 2023-08-29 ASSESSMENT — REFRACTION_CURRENTRX
OS_CYLINDER: 0.00
OD_ADD: +2.25
OS_VPRISM_DIRECTION: PROGS
OD_VPRISM_DIRECTION: PROGS
OS_SPHERE: +0.75
OD_CYLINDER: -1.25
OS_ADD: +2.25
OS_OVR_VA: 20/
OS_AXIS: 180
OD_AXIS: 084
OD_OVR_VA: 20/
OD_SPHERE: +1.75

## 2023-08-29 ASSESSMENT — AXIALLENGTH_DERIVED
OS_AL: 22.3427
OD_AL: 23.1055
OD_AL: 22.2507
OS_AL: 22.3865

## 2023-08-29 ASSESSMENT — KERATOMETRY
OS_K2POWER_DIOPTERS: 46.00
OD_AXISANGLE_DEGREES: 024
OS_AXISANGLE_DEGREES: 165
OS_K1POWER_DIOPTERS: 45.00
METHOD_AUTO_MANUAL: AUTO
OD_K2POWER_DIOPTERS: 45.75
OD_K1POWER_DIOPTERS: 44.75

## 2023-08-29 ASSESSMENT — SPHEQUIV_DERIVED
OS_SPHEQUIV: 1.375
OD_SPHEQUIV: -0.375
OD_SPHEQUIV: 2
OS_SPHEQUIV: 1.5

## 2023-08-29 ASSESSMENT — VISUAL ACUITY
OD_BCVA: 20/30
OS_BCVA: 20/20

## 2023-08-29 ASSESSMENT — CONFRONTATIONAL VISUAL FIELD TEST (CVF)
OS_FINDINGS: FULL
OD_FINDINGS: FULL

## 2023-08-29 ASSESSMENT — TONOMETRY: OS_IOP_MMHG: 20

## 2023-08-31 ENCOUNTER — OFFICE (OUTPATIENT)
Dept: URBAN - METROPOLITAN AREA CLINIC 102 | Facility: CLINIC | Age: 68
Setting detail: OPHTHALMOLOGY
End: 2023-08-31
Payer: MEDICARE

## 2023-08-31 DIAGNOSIS — H53.9: ICD-10-CM

## 2023-08-31 DIAGNOSIS — H25.12: ICD-10-CM

## 2023-08-31 PROCEDURE — 92083 EXTENDED VISUAL FIELD XM: CPT | Performed by: OPHTHALMOLOGY

## 2023-08-31 PROCEDURE — 92134 CPTRZ OPH DX IMG PST SGM RTA: CPT | Performed by: OPHTHALMOLOGY

## 2023-08-31 PROCEDURE — 92136 OPHTHALMIC BIOMETRY: CPT | Performed by: OPHTHALMOLOGY

## 2023-08-31 ASSESSMENT — REFRACTION_CURRENTRX
OD_ADD: +2.25
OD_OVR_VA: 20/
OS_VPRISM_DIRECTION: PROGS
OD_AXIS: 084
OS_ADD: +2.25
OS_CYLINDER: 0.00
OS_AXIS: 180
OD_CYLINDER: -1.25
OD_SPHERE: +1.75
OS_OVR_VA: 20/
OS_SPHERE: +0.75
OD_VPRISM_DIRECTION: PROGS

## 2023-08-31 ASSESSMENT — SPHEQUIV_DERIVED
OS_SPHEQUIV: 1.375
OS_SPHEQUIV: 1.875
OD_SPHEQUIV: 2
OD_SPHEQUIV: -0.25

## 2023-08-31 ASSESSMENT — CONFRONTATIONAL VISUAL FIELD TEST (CVF)
OS_FINDINGS: FULL
OD_FINDINGS: FULL

## 2023-08-31 ASSESSMENT — VISUAL ACUITY
OS_BCVA: 20/25+1
OD_BCVA: 20/40

## 2023-08-31 ASSESSMENT — REFRACTION_AUTOREFRACTION
OS_SPHERE: +2.25
OS_AXIS: 095
OD_AXIS: 159
OD_CYLINDER: -0.50
OD_SPHERE: 0.00
OS_CYLINDER: -0.75

## 2023-08-31 ASSESSMENT — REFRACTION_MANIFEST
OD_AXIS: 090
OD_SPHERE: PLANO
OS_AXIS: 090
OD_AXIS: 160
OS_SPHERE: +1.75
OS_CYLINDER: -0.75
OD_CYLINDER: -0.50
OD_SPHERE: +2.50
OD_CYLINDER: -1.00
OS_VA1: 20/30
OD_VA1: 20/30
OD_VA1: 20/20-1

## 2023-08-31 ASSESSMENT — TONOMETRY
OS_IOP_MMHG: 18
OD_IOP_MMHG: 20

## 2023-09-08 ENCOUNTER — OFFICE (OUTPATIENT)
Facility: LOCATION | Age: 68
Setting detail: OPHTHALMOLOGY
End: 2023-09-08
Payer: MEDICARE

## 2023-09-08 DIAGNOSIS — H16.223: ICD-10-CM

## 2023-09-08 DIAGNOSIS — H25.12: ICD-10-CM

## 2023-09-08 DIAGNOSIS — H16.222: ICD-10-CM

## 2023-09-08 DIAGNOSIS — H16.221: ICD-10-CM

## 2023-09-08 DIAGNOSIS — H53.9: ICD-10-CM

## 2023-09-08 PROBLEM — Z96.1 PSEUDOPHAKIA-1 WEEK P/O CAT WITH IOL: Status: ACTIVE | Noted: 2023-08-24

## 2023-09-08 PROCEDURE — 92083 EXTENDED VISUAL FIELD XM: CPT | Performed by: OPHTHALMOLOGY

## 2023-09-08 PROCEDURE — 83861 MICROFLUID ANALY TEARS: CPT | Performed by: OPHTHALMOLOGY

## 2023-09-08 PROCEDURE — 92014 COMPRE OPH EXAM EST PT 1/>: CPT | Performed by: OPHTHALMOLOGY

## 2023-09-08 PROCEDURE — 92133 CPTRZD OPH DX IMG PST SGM ON: CPT | Performed by: OPHTHALMOLOGY

## 2023-09-08 ASSESSMENT — KERATOMETRY
METHOD_AUTO_MANUAL: AUTO
OS_AXISANGLE_DEGREES: 078
OS_K2POWER_DIOPTERS: 45.00
OD_K1POWER_DIOPTERS: 45.75
OD_K2POWER_DIOPTERS: 44.75
OD_AXISANGLE_DEGREES: 091
OS_K1POWER_DIOPTERS: 45.75

## 2023-09-08 ASSESSMENT — DRY EYES - PHYSICIAN NOTES
OD_GENERALCOMMENTS: MILD
OS_GENERALCOMMENTS: MILD

## 2023-09-08 ASSESSMENT — REFRACTION_CURRENTRX
OD_ADD: +2.25
OD_SPHERE: +1.75
OD_VPRISM_DIRECTION: PROGS
OS_ADD: +2.25
OS_SPHERE: +0.75
OS_CYLINDER: 0.00
OS_AXIS: 180
OD_OVR_VA: 20/
OD_CYLINDER: -1.25
OS_VPRISM_DIRECTION: PROGS
OD_AXIS: 084
OS_OVR_VA: 20/

## 2023-09-08 ASSESSMENT — SPHEQUIV_DERIVED
OS_SPHEQUIV: 1.375
OD_SPHEQUIV: -0.375
OS_SPHEQUIV: 1.375
OD_SPHEQUIV: 2

## 2023-09-08 ASSESSMENT — REFRACTION_MANIFEST
OD_SPHERE: PLANO
OD_SPHERE: +2.50
OD_VA1: 20/30
OS_SPHERE: +1.75
OD_CYLINDER: -1.00
OD_AXIS: 090
OD_CYLINDER: -0.50
OS_CYLINDER: -0.75
OD_VA1: 20/20-1
OS_AXIS: 090
OD_AXIS: 160
OS_VA1: 20/30

## 2023-09-08 ASSESSMENT — AXIALLENGTH_DERIVED
OS_AL: 22.4279
OD_AL: 22.2507
OS_AL: 22.4279
OD_AL: 23.1055

## 2023-09-08 ASSESSMENT — REFRACTION_AUTOREFRACTION
OD_CYLINDER: -0.25
OS_AXIS: 084
OS_SPHERE: +1.50
OD_SPHERE: -0.25
OS_CYLINDER: -0.25

## 2023-09-08 ASSESSMENT — CONFRONTATIONAL VISUAL FIELD TEST (CVF)
OD_FINDINGS: FULL
OS_FINDINGS: FULL

## 2023-09-08 ASSESSMENT — VISUAL ACUITY
OS_BCVA: 20/30
OD_BCVA: 20/40

## 2023-09-13 ENCOUNTER — AMBUL SURGICAL CARE (OUTPATIENT)
Dept: URBAN - METROPOLITAN AREA SURGERY 14 | Facility: SURGERY | Age: 68
Setting detail: OPHTHALMOLOGY
End: 2023-09-13
Payer: MEDICARE

## 2023-09-13 DIAGNOSIS — H25.12: ICD-10-CM

## 2023-09-13 PROCEDURE — 66984 XCAPSL CTRC RMVL W/O ECP: CPT | Performed by: OPHTHALMOLOGY

## 2023-09-14 ENCOUNTER — OFFICE (OUTPATIENT)
Dept: URBAN - METROPOLITAN AREA CLINIC 102 | Facility: CLINIC | Age: 68
Setting detail: OPHTHALMOLOGY
End: 2023-09-14
Payer: MEDICARE

## 2023-09-14 DIAGNOSIS — Z96.1: ICD-10-CM

## 2023-09-14 PROCEDURE — 99024 POSTOP FOLLOW-UP VISIT: CPT | Performed by: OPHTHALMOLOGY

## 2023-09-14 ASSESSMENT — REFRACTION_MANIFEST
OS_SPHERE: +1.75
OD_CYLINDER: -1.00
OS_VA1: 20/30
OD_SPHERE: +2.50
OD_SPHERE: PLANO
OS_CYLINDER: -0.75
OD_VA1: 20/20-1
OD_VA1: 20/30
OS_AXIS: 090
OD_AXIS: 090
OD_CYLINDER: -0.50
OD_AXIS: 160

## 2023-09-14 ASSESSMENT — SPHEQUIV_DERIVED
OS_SPHEQUIV: 1.375
OS_SPHEQUIV: -0.875
OD_SPHEQUIV: -0.625
OD_SPHEQUIV: 2

## 2023-09-14 ASSESSMENT — KERATOMETRY
OS_K2POWER_DIOPTERS: 46.00
OD_K1POWER_DIOPTERS: 44.50
OD_AXISANGLE_DEGREES: 015
METHOD_AUTO_MANUAL: AUTO
OS_K1POWER_DIOPTERS: 45.50
OD_K2POWER_DIOPTERS: 45.50
OS_AXISANGLE_DEGREES: 129

## 2023-09-14 ASSESSMENT — VISUAL ACUITY
OS_BCVA: 20/25-1
OD_BCVA: 20/50

## 2023-09-14 ASSESSMENT — AXIALLENGTH_DERIVED
OD_AL: 22.3327
OS_AL: 22.3041
OD_AL: 23.2884
OS_AL: 23.1162

## 2023-09-14 ASSESSMENT — REFRACTION_AUTOREFRACTION
OS_AXIS: 057
OS_CYLINDER: -0.75
OD_CYLINDER: -0.25
OD_AXIS: 096
OD_SPHERE: -0.50
OS_SPHERE: -0.50

## 2023-09-14 ASSESSMENT — REFRACTION_CURRENTRX
OS_VPRISM_DIRECTION: PROGS
OD_OVR_VA: 20/
OD_SPHERE: +1.75
OS_AXIS: 180
OD_ADD: +2.25
OD_CYLINDER: -1.25
OD_VPRISM_DIRECTION: PROGS
OD_AXIS: 084
OS_SPHERE: +0.75
OS_ADD: +2.25
OS_OVR_VA: 20/
OS_CYLINDER: 0.00

## 2023-09-14 ASSESSMENT — CONFRONTATIONAL VISUAL FIELD TEST (CVF)
OS_FINDINGS: FULL
OD_FINDINGS: FULL

## 2023-09-14 ASSESSMENT — CORNEAL EDEMA CLINICAL DESCRIPTION: OS_CORNEALEDEMA: T

## 2023-09-14 ASSESSMENT — TONOMETRY: OD_IOP_MMHG: 17

## 2023-09-19 ENCOUNTER — OFFICE (OUTPATIENT)
Dept: URBAN - METROPOLITAN AREA CLINIC 102 | Facility: CLINIC | Age: 68
Setting detail: OPHTHALMOLOGY
End: 2023-09-19
Payer: MEDICARE

## 2023-09-19 DIAGNOSIS — Z96.1: ICD-10-CM

## 2023-09-19 PROCEDURE — 99024 POSTOP FOLLOW-UP VISIT: CPT | Performed by: STUDENT IN AN ORGANIZED HEALTH CARE EDUCATION/TRAINING PROGRAM

## 2023-09-19 ASSESSMENT — CONFRONTATIONAL VISUAL FIELD TEST (CVF)
OS_FINDINGS: FULL
OD_FINDINGS: FULL

## 2023-09-19 ASSESSMENT — REFRACTION_CURRENTRX
OS_CYLINDER: 0.00
OD_ADD: +2.25
OS_AXIS: 180
OS_OVR_VA: 20/
OS_VPRISM_DIRECTION: PROGS
OD_SPHERE: +1.75
OD_AXIS: 084
OD_CYLINDER: -1.25
OD_OVR_VA: 20/
OD_VPRISM_DIRECTION: PROGS
OS_ADD: +2.25
OS_SPHERE: +0.75

## 2023-09-19 ASSESSMENT — SPHEQUIV_DERIVED
OS_SPHEQUIV: 1.375
OD_SPHEQUIV: -0.625
OD_SPHEQUIV: 2

## 2023-09-19 ASSESSMENT — KERATOMETRY
OS_K2POWER_DIOPTERS: 45.75
OS_AXISANGLE_DEGREES: 106
OD_AXISANGLE_DEGREES: 013
METHOD_AUTO_MANUAL: AUTO
OS_K1POWER_DIOPTERS: 45.25
OD_K2POWER_DIOPTERS: 45.75
OD_K1POWER_DIOPTERS: 44.75

## 2023-09-19 ASSESSMENT — REFRACTION_MANIFEST
OS_CYLINDER: -0.75
OD_CYLINDER: -1.00
OD_CYLINDER: -0.50
OS_VA1: 20/30
OD_VA1: 20/20-1
OS_SPHERE: +1.75
OD_SPHERE: +2.50
OD_AXIS: 090
OD_VA1: 20/30
OD_SPHERE: PLANO
OS_AXIS: 090
OD_AXIS: 160

## 2023-09-19 ASSESSMENT — REFRACTION_AUTOREFRACTION
OD_SPHERE: -0.50
OS_SPHERE: PLANO
OS_CYLINDER: -1.00
OD_CYLINDER: -0.25
OS_AXIS: 069
OD_AXIS: 105

## 2023-09-19 ASSESSMENT — AXIALLENGTH_DERIVED
OD_AL: 22.2507
OS_AL: 22.3865
OD_AL: 23.1993

## 2023-09-19 ASSESSMENT — CORNEAL EDEMA CLINICAL DESCRIPTION: OS_CORNEALEDEMA: T

## 2023-09-19 ASSESSMENT — TONOMETRY: OD_IOP_MMHG: 21

## 2023-09-19 ASSESSMENT — VISUAL ACUITY
OS_BCVA: 20/25-1
OD_BCVA: 20/25

## 2023-10-05 ENCOUNTER — OFFICE (OUTPATIENT)
Dept: URBAN - METROPOLITAN AREA CLINIC 111 | Facility: CLINIC | Age: 68
Setting detail: OPHTHALMOLOGY
End: 2023-10-05
Payer: MEDICARE

## 2023-10-05 DIAGNOSIS — H53.9: ICD-10-CM

## 2023-10-05 PROCEDURE — 99213 OFFICE O/P EST LOW 20 MIN: CPT | Performed by: OPHTHALMOLOGY

## 2023-10-05 ASSESSMENT — CORNEAL EDEMA CLINICAL DESCRIPTION: OS_CORNEALEDEMA: T

## 2023-10-05 ASSESSMENT — REFRACTION_MANIFEST
OS_CYLINDER: -0.75
OS_AXIS: 090
OS_AXIS: 070
OD_CYLINDER: -1.00
OD_VA1: 20/20
OD_SPHERE: PLANO
OD_AXIS: 160
OD_VA1: 20/30
OD_AXIS: 090
OS_SPHERE: +1.75
OD_CYLINDER: -0.50
OD_VA1: 20/20-1
OD_CYLINDER: -0.50
OS_CYLINDER: -1.25
OS_SPHERE: -0.25
OS_VA1: 20/30
OD_AXIS: 125
OD_SPHERE: -0.50
OD_SPHERE: +2.50
OS_VA1: 20/20

## 2023-10-05 ASSESSMENT — KERATOMETRY
OD_K2POWER_DIOPTERS: 45.75
OS_K2POWER_DIOPTERS: 45.75
OS_K1POWER_DIOPTERS: 45.25
OD_K1POWER_DIOPTERS: 44.75
OS_AXISANGLE_DEGREES: 106
METHOD_AUTO_MANUAL: AUTO
OD_AXISANGLE_DEGREES: 013

## 2023-10-05 ASSESSMENT — REFRACTION_CURRENTRX
OS_ADD: +2.25
OD_CYLINDER: -1.25
OD_ADD: +2.25
OS_CYLINDER: 0.00
OS_SPHERE: +0.75
OS_OVR_VA: 20/
OD_SPHERE: +1.75
OS_VPRISM_DIRECTION: PROGS
OD_AXIS: 084
OD_OVR_VA: 20/
OS_AXIS: 180
OD_VPRISM_DIRECTION: PROGS

## 2023-10-05 ASSESSMENT — VISUAL ACUITY
OD_BCVA: 20/40
OS_BCVA: 20/40+2

## 2023-10-05 ASSESSMENT — AXIALLENGTH_DERIVED
OD_AL: 23.2465
OD_AL: 22.2507
OS_AL: 23.2047
OS_AL: 22.3865
OS_AL: 23.2047
OD_AL: 23.0588

## 2023-10-05 ASSESSMENT — SPHEQUIV_DERIVED
OD_SPHEQUIV: -0.25
OS_SPHEQUIV: -0.875
OS_SPHEQUIV: 1.375
OD_SPHEQUIV: 2
OS_SPHEQUIV: -0.875
OD_SPHEQUIV: -0.75

## 2023-10-05 ASSESSMENT — CONFRONTATIONAL VISUAL FIELD TEST (CVF)
OD_FINDINGS: FULL
OS_FINDINGS: FULL

## 2023-10-05 ASSESSMENT — REFRACTION_AUTOREFRACTION
OS_SPHERE: -0.25
OD_AXIS: 123
OS_CYLINDER: -1.25
OD_CYLINDER: +0.50
OD_SPHERE: -0.50
OS_AXIS: 069

## 2023-10-24 ENCOUNTER — OFFICE (OUTPATIENT)
Dept: URBAN - METROPOLITAN AREA CLINIC 102 | Facility: CLINIC | Age: 68
Setting detail: OPHTHALMOLOGY
End: 2023-10-24
Payer: MEDICARE

## 2023-10-24 DIAGNOSIS — H52.4: ICD-10-CM

## 2023-10-24 DIAGNOSIS — Z96.1: ICD-10-CM

## 2023-10-24 PROBLEM — H52.7 REFRACTIVE ERROR: Status: ACTIVE | Noted: 2023-10-24

## 2023-10-24 PROCEDURE — 92015 DETERMINE REFRACTIVE STATE: CPT | Performed by: OPHTHALMOLOGY

## 2023-10-24 PROCEDURE — 99024 POSTOP FOLLOW-UP VISIT: CPT | Performed by: OPHTHALMOLOGY

## 2023-10-24 ASSESSMENT — REFRACTION_MANIFEST
OS_AXIS: 090
OS_CYLINDER: -1.25
OD_SPHERE: -0.50
OS_VA1: 20/25-1
OD_AXIS: 090
OD_SPHERE: +2.50
OS_AXIS: 075
OD_CYLINDER: -0.50
OS_SPHERE: -0.25
OS_VA1: 20/20
OS_CYLINDER: -1.25
OS_SPHERE: +1.75
OD_SPHERE: -0.25
OD_ADD: +2.25
OD_CYLINDER: -1.00
OD_AXIS: 110
OS_VA1: 20/30
OD_CYLINDER: -0.25
OD_VA1: 20/25
OS_ADD: +2.25
OD_VA1: 20/20
OS_SPHERE: PLANO
OD_VA1: 20/30
OD_AXIS: 125
OS_CYLINDER: -0.75
OS_AXIS: 070

## 2023-10-24 ASSESSMENT — SPHEQUIV_DERIVED
OS_SPHEQUIV: -0.875
OS_SPHEQUIV: 1.375
OD_SPHEQUIV: 2
OD_SPHEQUIV: -0.75
OS_SPHEQUIV: -0.625
OD_SPHEQUIV: -0.375
OD_SPHEQUIV: -0.375

## 2023-10-24 ASSESSMENT — REFRACTION_CURRENTRX
OS_AXIS: 180
OD_AXIS: 084
OD_SPHERE: +1.75
OS_SPHERE: +0.75
OS_OVR_VA: 20/
OD_OVR_VA: 20/
OS_VPRISM_DIRECTION: PROGS
OD_ADD: +2.25
OD_CYLINDER: -1.25
OS_CYLINDER: 0.00
OS_ADD: +2.25
OD_VPRISM_DIRECTION: PROGS

## 2023-10-24 ASSESSMENT — KERATOMETRY
OS_K1POWER_DIOPTERS: 45.00
OS_K2POWER_DIOPTERS: 46.00
OD_K1POWER_DIOPTERS: 44.25
OD_AXISANGLE_DEGREES: 005
OS_AXISANGLE_DEGREES: 156
METHOD_AUTO_MANUAL: AUTO
OD_K2POWER_DIOPTERS: 46.25

## 2023-10-24 ASSESSMENT — AXIALLENGTH_DERIVED
OD_AL: 22.2507
OS_AL: 23.2047
OS_AL: 23.1108
OD_AL: 23.1055
OD_AL: 23.1055
OD_AL: 23.2465
OS_AL: 22.3865

## 2023-10-24 ASSESSMENT — REFRACTION_AUTOREFRACTION
OS_AXIS: 075
OS_SPHERE: 0.00
OS_CYLINDER: -1.25
OD_SPHERE: -0.25
OD_CYLINDER: -0.25
OD_AXIS: 111

## 2023-10-24 ASSESSMENT — VISUAL ACUITY
OD_BCVA: 20/40+2
OS_BCVA: 20/30+2

## 2023-10-24 ASSESSMENT — CONFRONTATIONAL VISUAL FIELD TEST (CVF)
OS_FINDINGS: FULL
OD_FINDINGS: FULL

## 2023-10-24 ASSESSMENT — TONOMETRY
OS_IOP_MMHG: 16
OD_IOP_MMHG: 15

## 2023-10-24 ASSESSMENT — CORNEAL EDEMA CLINICAL DESCRIPTION: OS_CORNEALEDEMA: T

## 2024-01-02 PROBLEM — C34.90 MALIGNANT NEOPLASM OF UNSPECIFIED PART OF UNSPECIFIED BRONCHUS OR LUNG: Chronic | Status: ACTIVE | Noted: 2023-04-22

## 2024-01-02 PROBLEM — C50.919 MALIGNANT NEOPLASM OF UNSPECIFIED SITE OF UNSPECIFIED FEMALE BREAST: Chronic | Status: ACTIVE | Noted: 2023-04-25

## 2024-01-29 ENCOUNTER — APPOINTMENT (OUTPATIENT)
Dept: GASTROENTEROLOGY | Facility: CLINIC | Age: 69
End: 2024-01-29
Payer: MEDICARE

## 2024-01-29 VITALS
WEIGHT: 175 LBS | HEIGHT: 63 IN | BODY MASS INDEX: 31.01 KG/M2 | DIASTOLIC BLOOD PRESSURE: 80 MMHG | SYSTOLIC BLOOD PRESSURE: 138 MMHG

## 2024-01-29 DIAGNOSIS — R13.10 DYSPHAGIA, UNSPECIFIED: ICD-10-CM

## 2024-01-29 PROCEDURE — 99213 OFFICE O/P EST LOW 20 MIN: CPT

## 2024-01-29 RX ORDER — FAMOTIDINE 40 MG/1
40 TABLET, FILM COATED ORAL
Qty: 90 | Refills: 0 | Status: ACTIVE | COMMUNITY
Start: 2024-01-29 | End: 1900-01-01

## 2024-01-29 NOTE — ASSESSMENT
[FreeTextEntry1] : Plan: Since pt has dysphagia, would recommend EGD. Risks versus benefits as well as instructions reviewed, pt agrees to planned procedure. All questions answered. I have spent 30 minutes on this encounter.

## 2024-01-29 NOTE — HISTORY OF PRESENT ILLNESS
[FreeTextEntry1] : Ana Lilia Silva is a 68 year old female PMH metastatic brain cancer on maintenance medications presents today for initial evaluation of dysphagia. Pt reports it has been occurring for several months, worsening over the last few weeks. has to drink liquids to help push the food down. Has never had to vomit or regurgitate. Denies any unintentional weight loss associated. has been taking omeprazole 40MG prescribed by her PCP with no relief. Deines any associated bowel changes. Pt recalls many years ago having EGD and being told she has a stricture that may need to be dilated in the future. 
15-Mar-2023 10:59

## 2024-01-29 NOTE — PHYSICAL EXAM
[Alert] : alert [Healthy Appearing] : healthy appearing [Sclera] : the sclera and conjunctiva were normal [Hearing Threshold Finger Rub Not [place]] : hearing was normal [Normal Appearance] : the appearance of the neck was normal [No Respiratory Distress] : no respiratory distress [Auscultation Breath Sounds / Voice Sounds] : lungs were clear to auscultation bilaterally [Heart Rate And Rhythm] : heart rate was normal and rhythm regular [Bowel Sounds] : normal bowel sounds [Abdomen Tenderness] : non-tender [Abdomen Soft] : soft [Abnormal Walk] : normal gait [Normal Color / Pigmentation] : normal skin color and pigmentation [Oriented To Time, Place, And Person] : oriented to person, place, and time

## 2024-02-01 ENCOUNTER — APPOINTMENT (OUTPATIENT)
Dept: GASTROENTEROLOGY | Facility: AMBULATORY MEDICAL SERVICES | Age: 69
End: 2024-02-01

## 2024-02-22 ENCOUNTER — APPOINTMENT (OUTPATIENT)
Dept: GASTROENTEROLOGY | Facility: AMBULATORY MEDICAL SERVICES | Age: 69
End: 2024-02-22

## 2024-04-29 PROBLEM — Z12.11 COLON CANCER SCREENING: Status: ACTIVE | Noted: 2024-04-29

## 2024-04-29 PROBLEM — Z12.4 CERVICAL CANCER SCREENING: Status: ACTIVE | Noted: 2024-04-29

## 2024-05-06 ENCOUNTER — APPOINTMENT (OUTPATIENT)
Dept: OBGYN | Facility: CLINIC | Age: 69
End: 2024-05-06

## 2024-05-06 DIAGNOSIS — Z12.4 ENCOUNTER FOR SCREENING FOR MALIGNANT NEOPLASM OF CERVIX: ICD-10-CM

## 2024-05-06 DIAGNOSIS — Z12.11 ENCOUNTER FOR SCREENING FOR MALIGNANT NEOPLASM OF COLON: ICD-10-CM

## 2024-05-22 ENCOUNTER — APPOINTMENT (OUTPATIENT)
Dept: BREAST CENTER | Facility: CLINIC | Age: 69
End: 2024-05-22
Payer: MEDICARE

## 2024-05-22 VITALS — BODY MASS INDEX: 31.01 KG/M2 | HEIGHT: 63 IN | WEIGHT: 175 LBS | RESPIRATION RATE: 16 BRPM

## 2024-05-22 DIAGNOSIS — N64.89 OTHER SPECIFIED DISORDERS OF BREAST: ICD-10-CM

## 2024-05-22 PROCEDURE — 99213 OFFICE O/P EST LOW 20 MIN: CPT | Mod: 25

## 2024-05-22 PROCEDURE — 76641 ULTRASOUND BREAST COMPLETE: CPT | Mod: 50

## 2024-05-22 RX ORDER — LETROZOLE TABLETS 2.5 MG/1
TABLET, FILM COATED ORAL
Refills: 0 | Status: ACTIVE | COMMUNITY

## 2024-05-22 RX ORDER — RIBOCICLIB 200 MG/1
TABLET, FILM COATED ORAL
Refills: 0 | Status: ACTIVE | COMMUNITY

## 2024-05-22 NOTE — ASSESSMENT
[FreeTextEntry1] : History for breast cancer to the bone metastasis  There is no suspicious findings noted in either breast  There is no suspicious lymphadenopathy  Patient reassured  Follow-up 6 months  A total of 20 minutes was spent in consultation evaluation review

## 2024-05-22 NOTE — HISTORY OF PRESENT ILLNESS
[FreeTextEntry1] : Patient has metastatic breast cancer and is currently doing immunotherapy  Patient states she feels a pinch nerve on both breasts. Denies new breast pain and new breast mass.

## 2024-05-22 NOTE — PROCEDURE
[FreeTextEntry3] : Bilateral breast ultrasound  The patient has a history for metastatic breast cancer of unknown primary region  Four-quadrant survey the left breast demonstrates no developing mass  Four-quadrant survey the right breast demonstrates no developing mass  There is no suspicious lymphadenopathy  BI-RADS 2

## 2024-05-28 ENCOUNTER — APPOINTMENT (OUTPATIENT)
Dept: OBGYN | Facility: CLINIC | Age: 69
End: 2024-05-28
Payer: MEDICARE

## 2024-05-28 VITALS
DIASTOLIC BLOOD PRESSURE: 82 MMHG | SYSTOLIC BLOOD PRESSURE: 146 MMHG | WEIGHT: 175 LBS | HEART RATE: 71 BPM | HEIGHT: 63 IN | BODY MASS INDEX: 31.01 KG/M2

## 2024-05-28 DIAGNOSIS — Z12.39 ENCOUNTER FOR OTHER SCREENING FOR MALIGNANT NEOPLASM OF BREAST: ICD-10-CM

## 2024-05-28 DIAGNOSIS — M85.80 OTHER SPECIFIED DISORDERS OF BONE DENSITY AND STRUCTURE, UNSPECIFIED SITE: ICD-10-CM

## 2024-05-28 DIAGNOSIS — Z01.419 ENCOUNTER FOR GYNECOLOGICAL EXAMINATION (GENERAL) (ROUTINE) W/OUT ABNORMAL FINDINGS: ICD-10-CM

## 2024-05-28 LAB
BILIRUB UR QL STRIP: NORMAL
CLARITY UR: CLEAR
COLLECTION METHOD: NORMAL
GLUCOSE UR-MCNC: NORMAL
HCG UR QL: 0.2 EU/DL
HGB UR QL STRIP.AUTO: NORMAL
KETONES UR-MCNC: NORMAL
LEUKOCYTE ESTERASE UR QL STRIP: NORMAL
NITRITE UR QL STRIP: NORMAL
PH UR STRIP: 6
PROT UR STRIP-MCNC: NORMAL
SP GR UR STRIP: 1.01

## 2024-05-28 PROCEDURE — 81003 URINALYSIS AUTO W/O SCOPE: CPT | Mod: QW

## 2024-05-28 PROCEDURE — G0101: CPT

## 2024-05-28 NOTE — PHYSICAL EXAM
[Appropriately responsive] : appropriately responsive [Alert] : alert [No Acute Distress] : no acute distress [No Lymphadenopathy] : no lymphadenopathy [Soft] : soft [Non-tender] : non-tender [Non-distended] : non-distended [No HSM] : No HSM [No Lesions] : no lesions [No Mass] : no mass [Oriented x3] : oriented x3 [Examination Of The Breasts] : a normal appearance [No Masses] : no breast masses were palpable [Vulvar Atrophy] : vulvar atrophy [Labia Majora] : normal [Labia Minora] : normal [Atrophy] : atrophy [Normal] : normal [Uterine Adnexae] : normal

## 2024-05-28 NOTE — HISTORY OF PRESENT ILLNESS
[TextBox_4] : 69  year old female presents for her annual visit. Denies GYN complaints at this time. . hx- osteopenia   She  was recently diagnosed with metastatic lung CA to the bones. she is followed by dr. paz and dr. duque.  She states the cancer was in the breast and left the breast went into the bones. she is on immunotherapy. She is on xgeva for bone density while on immunotherapy  she is on letrozole. denies any PMB.  she is followed by dr. duque and dr. paz every 6 months.

## 2024-05-28 NOTE — DISCUSSION/SUMMARY
[FreeTextEntry1] : Patient to follow up in 1 year for annual GYN exam Mammogram  due: per patient she sees [facility] every 6 months for sonogram  Colonoscopy due: 02/2030 Follows with colorectal for hemorrhoids.  Bone density due: will speak with dr. duque and call me if she needs bone density done.  Pap ordered  Hemoccult ordered All questions answered, patient is agreeable with plan. PMB precautions reviewed vaginal lubricants PRN

## 2024-06-03 LAB — CYTOLOGY CVX/VAG DOC THIN PREP: NORMAL

## 2024-08-01 RX ORDER — OMEPRAZOLE 40 MG/1
40 CAPSULE, DELAYED RELEASE ORAL
Qty: 90 | Refills: 0 | Status: DISCONTINUED | COMMUNITY
Start: 2024-08-01 | End: 2024-08-01

## 2024-08-01 RX ORDER — PANTOPRAZOLE 40 MG/1
40 TABLET, DELAYED RELEASE ORAL DAILY
Qty: 1 | Refills: 3 | Status: ACTIVE | COMMUNITY
Start: 2024-08-01 | End: 1900-01-01

## 2024-08-01 NOTE — HISTORY OF PRESENT ILLNESS
Levothyroxine 112 mcg six days per week, ONE HALF tablet one day per week  Take levothyroxine on an empty stomach with water alone, 1 hour before eating or taking other medications, 4 hours before any calcium or iron supplement.    Follow up 3 months  Repeat TSH before next appointment.     Defer BP med to Dr. Dupree.  Typically Losartan is approved with diabetes.  I do not know of any thyroid benefits of switching to carvedilol.   [TextBox_4] : 67  year old female presents for her annual visit. Denies GYN complaints at this time. c/o minimal breast tenderness. hx- osteopenia \par

## 2024-08-06 ENCOUNTER — APPOINTMENT (OUTPATIENT)
Dept: GASTROENTEROLOGY | Facility: AMBULATORY MEDICAL SERVICES | Age: 69
End: 2024-08-06

## 2024-08-06 ENCOUNTER — RESULT REVIEW (OUTPATIENT)
Age: 69
End: 2024-08-06

## 2024-08-06 PROCEDURE — 43239 EGD BIOPSY SINGLE/MULTIPLE: CPT

## 2024-10-02 RX ORDER — ESOMEPRAZOLE MAGNESIUM 40 MG/1
40 CAPSULE, DELAYED RELEASE ORAL
Qty: 90 | Refills: 0 | Status: ACTIVE | COMMUNITY
Start: 2024-10-02 | End: 1900-01-01

## 2024-12-23 ENCOUNTER — RX RENEWAL (OUTPATIENT)
Age: 69
End: 2024-12-23

## 2025-05-28 ENCOUNTER — NON-APPOINTMENT (OUTPATIENT)
Age: 70
End: 2025-05-28

## 2025-05-30 ENCOUNTER — APPOINTMENT (OUTPATIENT)
Dept: INTERNAL MEDICINE | Facility: CLINIC | Age: 70
End: 2025-05-30
Payer: MEDICARE

## 2025-05-30 VITALS
BODY MASS INDEX: 26.22 KG/M2 | HEART RATE: 77 BPM | HEIGHT: 63 IN | SYSTOLIC BLOOD PRESSURE: 112 MMHG | DIASTOLIC BLOOD PRESSURE: 80 MMHG | WEIGHT: 148 LBS | RESPIRATION RATE: 16 BRPM | TEMPERATURE: 98 F | OXYGEN SATURATION: 98 %

## 2025-05-30 DIAGNOSIS — C34.91 MALIGNANT NEOPLASM OF UNSPECIFIED PART OF RIGHT BRONCHUS OR LUNG: ICD-10-CM

## 2025-05-30 DIAGNOSIS — J44.9 CHRONIC OBSTRUCTIVE PULMONARY DISEASE, UNSPECIFIED: ICD-10-CM

## 2025-05-30 DIAGNOSIS — Z17.0 MALIGNANT NEOPLASM OF UNSPECIFIED SITE OF RIGHT FEMALE BREAST: ICD-10-CM

## 2025-05-30 DIAGNOSIS — R06.09 OTHER FORMS OF DYSPNEA: ICD-10-CM

## 2025-05-30 DIAGNOSIS — C50.911 MALIGNANT NEOPLASM OF UNSPECIFIED SITE OF RIGHT FEMALE BREAST: ICD-10-CM

## 2025-05-30 DIAGNOSIS — Z87.891 PERSONAL HISTORY OF NICOTINE DEPENDENCE: ICD-10-CM

## 2025-05-30 DIAGNOSIS — C34.92 MALIGNANT NEOPLASM OF UNSPECIFIED PART OF LEFT BRONCHUS OR LUNG: ICD-10-CM

## 2025-05-30 PROBLEM — R91.1 PULMONARY NODULE, LEFT: Status: ACTIVE | Noted: 2025-05-30

## 2025-05-30 PROCEDURE — 94729 DIFFUSING CAPACITY: CPT

## 2025-05-30 PROCEDURE — 94727 GAS DIL/WSHOT DETER LNG VOL: CPT

## 2025-05-30 PROCEDURE — 99205 OFFICE O/P NEW HI 60 MIN: CPT | Mod: 25

## 2025-05-30 PROCEDURE — 94060 EVALUATION OF WHEEZING: CPT

## 2025-05-30 PROCEDURE — ZZZZZ: CPT

## 2025-05-30 RX ORDER — FLUTICASONE FUROATE, UMECLIDINIUM BROMIDE AND VILANTEROL TRIFENATATE 100; 62.5; 25 UG/1; UG/1; UG/1
100-62.5-25 POWDER RESPIRATORY (INHALATION) DAILY
Qty: 90 | Refills: 3 | Status: ACTIVE | COMMUNITY
Start: 2025-05-30 | End: 1900-01-01

## 2025-05-30 RX ORDER — METFORMIN HYDROCHLORIDE 750 MG/1
TABLET ORAL
Refills: 0 | Status: ACTIVE | COMMUNITY

## 2025-06-06 ENCOUNTER — NON-APPOINTMENT (OUTPATIENT)
Age: 70
End: 2025-06-06

## 2025-06-17 ENCOUNTER — APPOINTMENT (OUTPATIENT)
Dept: OBGYN | Facility: CLINIC | Age: 70
End: 2025-06-17

## 2025-07-22 RX ORDER — UMECLIDINIUM BROMIDE AND VILANTEROL TRIFENATATE 62.5; 25 UG/1; UG/1
62.5-25 POWDER RESPIRATORY (INHALATION) DAILY
Qty: 1 | Refills: 3 | Status: ACTIVE | COMMUNITY
Start: 2025-07-22 | End: 1900-01-01

## 2025-09-18 ENCOUNTER — APPOINTMENT (OUTPATIENT)
Dept: CT IMAGING | Facility: CLINIC | Age: 70
End: 2025-09-18
Payer: MEDICARE

## 2025-09-18 PROCEDURE — 71260 CT THORAX DX C+: CPT | Mod: 26
